# Patient Record
Sex: MALE | Race: BLACK OR AFRICAN AMERICAN | NOT HISPANIC OR LATINO | Employment: FULL TIME | URBAN - METROPOLITAN AREA
[De-identification: names, ages, dates, MRNs, and addresses within clinical notes are randomized per-mention and may not be internally consistent; named-entity substitution may affect disease eponyms.]

---

## 2021-11-23 ENCOUNTER — HISTORICAL (OUTPATIENT)
Dept: LAB | Facility: HOSPITAL | Age: 31
End: 2021-11-23

## 2021-11-23 LAB — SARS-COV-2 RNA RESP QL NAA+PROBE: NOT DETECTED

## 2022-02-25 ENCOUNTER — HISTORICAL (OUTPATIENT)
Dept: ADMINISTRATIVE | Facility: HOSPITAL | Age: 32
End: 2022-02-25

## 2022-03-04 ENCOUNTER — HISTORICAL (OUTPATIENT)
Dept: ADMINISTRATIVE | Facility: HOSPITAL | Age: 32
End: 2022-03-04

## 2022-05-30 ENCOUNTER — HOSPITAL ENCOUNTER (EMERGENCY)
Facility: HOSPITAL | Age: 32
Discharge: HOME | End: 2022-05-30
Attending: EMERGENCY MEDICINE

## 2022-05-30 VITALS
OXYGEN SATURATION: 97 % | BODY MASS INDEX: 45.52 KG/M2 | TEMPERATURE: 98 F | RESPIRATION RATE: 18 BRPM | DIASTOLIC BLOOD PRESSURE: 88 MMHG | HEART RATE: 104 BPM | SYSTOLIC BLOOD PRESSURE: 157 MMHG | HEIGHT: 67 IN | WEIGHT: 290 LBS

## 2022-05-30 DIAGNOSIS — S91.112A LACERATION OF LEFT GREAT TOE WITHOUT FOREIGN BODY PRESENT OR DAMAGE TO NAIL, INITIAL ENCOUNTER: Primary | ICD-10-CM

## 2022-05-30 PROCEDURE — 25000003 PHARM REV CODE 250: Performed by: NURSE PRACTITIONER

## 2022-05-30 PROCEDURE — 99283 EMERGENCY DEPT VISIT LOW MDM: CPT | Mod: 25

## 2022-05-30 RX ORDER — MUPIROCIN 20 MG/G
OINTMENT TOPICAL 3 TIMES DAILY
Qty: 30 G | Refills: 0 | Status: SHIPPED | OUTPATIENT
Start: 2022-05-30 | End: 2022-06-16 | Stop reason: SDUPTHER

## 2022-05-30 RX ADMIN — IBUPROFEN 800 MG: 200 TABLET, FILM COATED ORAL at 02:05

## 2022-05-30 NOTE — FIRST PROVIDER EVALUATION
"Medical screening exam completed.  I have conducted a focused provider triage encounter, findings are as follows:    Brief history of present illness:  32 year old male presents to ED for left great toe pain; reports he hit his foot on a piece of plywood 2 nights ago; patient reports drainage; denies fever    Vitals:    05/30/22 1203   BP: (!) 157/88   BP Location: Left arm   Patient Position: Sitting   Pulse: 104   Resp: 18   Temp: 98.1 °F (36.7 °C)   TempSrc: Oral   SpO2: 97%   Weight: 131.5 kg (290 lb)   Height: 5' 7" (1.702 m)       Pertinent physical exam:  Awake, alert    Brief workup plan:  X-ray foot     Preliminary workup initiated; this workup will be continued and followed by the physician or advanced practice provider that is assigned to the patient when roomed.  "

## 2022-05-30 NOTE — DISCHARGE INSTRUCTIONS
Keep wound clean with soap and water, apply bactroban ointment to the area twice a day. Tylenol and/or ibuprofen for pain as needed.

## 2022-05-30 NOTE — ED PROVIDER NOTES
Encounter Date: 5/30/2022       History     Chief Complaint   Patient presents with    Foot Pain     C/o L foot pain. Reports cut on L great toe 1 day ago.      32-year-old male who presents with left great toe superficial cut that have been on some broken diana at his house yesterday.  He states it hurts when he walks in certain positions.  He does not believe there is any type of foreign body in the cut.    The history is provided by the patient. No  was used.   Foot Pain  This is a new problem. The current episode started yesterday. The problem occurs constantly. The symptoms are aggravated by walking. Nothing relieves the symptoms. He has tried nothing for the symptoms. The treatment provided no relief.     Review of patient's allergies indicates:  No Known Allergies  No past medical history on file.  No past surgical history on file.  No family history on file.     Review of Systems   Respiratory: Negative.    Cardiovascular: Negative.    Skin:        Small 0.5 cm superficial cut on left great toe   All other systems reviewed and are negative.      Physical Exam     Initial Vitals [05/30/22 1203]   BP Pulse Resp Temp SpO2   (!) 157/88 104 18 98.1 °F (36.7 °C) 97 %      MAP       --         Physical Exam    Nursing note and vitals reviewed.  Constitutional: He appears well-developed and well-nourished.   Eyes: Conjunctivae are normal.   Cardiovascular: Intact distal pulses.   Pulmonary/Chest: No respiratory distress.   Musculoskeletal:        Legs:      Neurological: He is alert and oriented to person, place, and time. He has normal strength.   Skin: Skin is warm.   Psychiatric: He has a normal mood and affect.         ED Course   Procedures  Labs Reviewed - No data to display       Imaging Results          X-Ray Toe 2 or More Views Left (Final result)  Result time 05/30/22 12:42:57    Final result by Jolly Puente MD (05/30/22 12:42:57)                 Impression:      No acute  bony abnormality      Electronically signed by: Jolly Puente  Date:    05/30/2022  Time:    12:42             Narrative:    EXAMINATION:  XR TOE 2 OR MORE VIEWS LEFT    CLINICAL HISTORY:  left great toe pain; hit toe on piece of plywood 2 nights ago;    COMPARISON:  None.    FINDINGS:  There is no acute fracture or malalignment.  The soft tissues are unremarkable.                                 Medications   ibuprofen tablet 800 mg (has no administration in time range)     Medical Decision Making:   Clinical Tests:   Radiological Study: Ordered and Reviewed  ED Management:  Wound cleaned, dressed with triple antibiotic ointment and bandaid applied by ER nurse    Additional MDM:   Differential Diagnosis:   Other: The following diagnoses were also considered and will be evaluated: toe fracture, toe laceration.                    Clinical Impression:   Final diagnoses:  [S91.112A] Laceration of left great toe without foreign body present or damage to nail, initial encounter (Primary)          ED Disposition Condition    Discharge Stable        ED Prescriptions     Medication Sig Dispense Start Date End Date Auth. Provider    mupirocin (BACTROBAN) 2 % ointment Apply topically 3 (three) times daily. 30 g 5/30/2022  YURI Mcnair        Follow-up Information     Follow up With Specialties Details Why Contact Info    primary care provider  Call in 1 week As needed, If symptoms worsen            YURI Mcnair  05/30/22 0934

## 2022-06-16 ENCOUNTER — OFFICE VISIT (OUTPATIENT)
Dept: FAMILY MEDICINE | Facility: CLINIC | Age: 32
End: 2022-06-16

## 2022-06-16 VITALS
HEART RATE: 81 BPM | SYSTOLIC BLOOD PRESSURE: 139 MMHG | WEIGHT: 315 LBS | TEMPERATURE: 98 F | RESPIRATION RATE: 20 BRPM | HEIGHT: 67 IN | OXYGEN SATURATION: 97 % | DIASTOLIC BLOOD PRESSURE: 91 MMHG | BODY MASS INDEX: 49.44 KG/M2

## 2022-06-16 DIAGNOSIS — R35.0 INCREASED URINARY FREQUENCY: ICD-10-CM

## 2022-06-16 DIAGNOSIS — I10 PRIMARY HYPERTENSION: ICD-10-CM

## 2022-06-16 DIAGNOSIS — N52.8 OTHER MALE ERECTILE DYSFUNCTION: ICD-10-CM

## 2022-06-16 LAB
APPEARANCE UR: CLEAR
BACTERIA #/AREA URNS AUTO: ABNORMAL /HPF
BILIRUB UR QL STRIP.AUTO: NEGATIVE MG/DL
COLOR UR AUTO: ABNORMAL
GLUCOSE UR QL STRIP.AUTO: NORMAL MG/DL
HYALINE CASTS #/AREA URNS LPF: ABNORMAL /LPF
KETONES UR QL STRIP.AUTO: NEGATIVE MG/DL
LEUKOCYTE ESTERASE UR QL STRIP.AUTO: NEGATIVE UNIT/L
NITRITE UR QL STRIP.AUTO: NEGATIVE
PH UR STRIP.AUTO: 6.5 [PH]
PROT UR QL STRIP.AUTO: NEGATIVE MG/DL
RBC #/AREA URNS AUTO: ABNORMAL /HPF
RBC UR QL AUTO: NEGATIVE UNIT/L
SP GR UR STRIP.AUTO: 1.01
SQUAMOUS #/AREA URNS LPF: ABNORMAL /HPF
UROBILINOGEN UR STRIP-ACNC: NORMAL MG/DL
WBC #/AREA URNS AUTO: ABNORMAL /HPF

## 2022-06-16 PROCEDURE — 99213 OFFICE O/P EST LOW 20 MIN: CPT | Mod: PBBFAC,PN

## 2022-06-16 PROCEDURE — 81001 URINALYSIS AUTO W/SCOPE: CPT

## 2022-06-16 PROCEDURE — 99214 PR OFFICE/OUTPT VISIT, EST, LEVL IV, 30-39 MIN: ICD-10-PCS | Mod: S$PBB,,,

## 2022-06-16 PROCEDURE — 99214 OFFICE O/P EST MOD 30 MIN: CPT | Mod: S$PBB,,,

## 2022-06-16 PROCEDURE — 36415 COLL VENOUS BLD VENIPUNCTURE: CPT

## 2022-06-16 RX ORDER — HYDROCHLOROTHIAZIDE 12.5 MG/1
12.5 CAPSULE ORAL DAILY
COMMUNITY
Start: 2022-05-13 | End: 2022-06-16

## 2022-06-16 RX ORDER — SILDENAFIL 25 MG/1
25 TABLET, FILM COATED ORAL
Qty: 30 TABLET | Refills: 0 | Status: SHIPPED | OUTPATIENT
Start: 2022-06-16 | End: 2023-06-26

## 2022-06-16 RX ORDER — CITALOPRAM 20 MG/1
20 TABLET, FILM COATED ORAL EVERY MORNING
COMMUNITY
Start: 2022-06-03 | End: 2022-12-06 | Stop reason: SDUPTHER

## 2022-06-16 RX ORDER — HYDROCHLOROTHIAZIDE 25 MG/1
25 TABLET ORAL DAILY
Qty: 30 TABLET | Refills: 11 | Status: SHIPPED | OUTPATIENT
Start: 2022-06-16 | End: 2023-06-26 | Stop reason: SDUPTHER

## 2022-06-16 RX ORDER — MUPIROCIN 20 MG/G
OINTMENT TOPICAL 3 TIMES DAILY
Qty: 30 G | Refills: 0 | Status: SHIPPED | OUTPATIENT
Start: 2022-06-16 | End: 2023-06-26

## 2022-06-16 NOTE — PROGRESS NOTES
"Patient Name: Ryan Mcbride   : 1990  MRN: [id number]     Subjective:   Patient ID: Ryan Mcbride is a 32 y.o. male.    Chief Complaint:   Chief Complaint   Patient presents with    Follow-up        HPI:   2022:  Today about his weight and increased urination, we can screen him for diabetes today as this has never been done.  He is still self pay and has not yet obtained insurance.  He is hypertensive in clinic today denies headache, blurred vision, chest pain or shortness of breath.  Patient is getting  on Saturday in is concerned about his erectile dysfunction.  Patient also went to [facility] to try and help him lose weight.  States that he does not have time to work out because he works from -.  At length discussion with patient about healthy eating habits and exercise.      3/8/22: Patient went to the emergency department on  for left chest pain on evaluation was found to have abscess under left breast. This was drained by the surgery department. Patient returned to the emergency department on  to pull the packing and clean. I&D noted to be clean dry and in "better comparison to previous note" patient is following up with surgery clinic today at 11 AM. He is taking ibuprofen for the pain and was prescribed Bactrim 800/160 mg which she has been compliant with. Patient also on previous emergency department visits restarted on blood pressure medicine he is taking hydrochlorothiazide 12.5 mg daily. Denies blurred vision chest pain palpitations or shortness of breath. Patient recently had CBC/CMP in ED with no other preventative screenings previously performed. Patient concerned about his ability to lose weight, states that "all these medicines he is taking" are causing him to gain weight. Patient has combined all of his medicines that he has been prescribed from the emergency departments over the past 2 months into 1 pill bottle. Educated on importance of keeping " medications in original pill bottle and medication safety. Reeducated patient that he does not need to take his as needed NSAIDs and muscle relaxers and less he is in pain. Educated patient that the February 25 weight reading of 291 might of possibly been a miscalculation because 2 weeks prior on February 13 he weighed 324 pounds which is more consistent with his weight reading of 325 pounds today.      ROS:  Review of Systems   Constitutional: Negative for chills, fever and weight loss.   HENT: Negative for ear discharge, nosebleeds and tinnitus.    Eyes: Negative for blurred vision, photophobia and pain.   Respiratory: Negative for cough, shortness of breath, wheezing and stridor.    Cardiovascular: Negative for chest pain, palpitations and orthopnea.   Gastrointestinal: Negative for abdominal pain, heartburn and nausea.   Genitourinary: Negative for dysuria, frequency, hematuria and urgency.   Musculoskeletal: Negative for falls and myalgias.   Skin: Negative for itching and rash.   Neurological: Negative for dizziness, sensory change, speech change, focal weakness, seizures, weakness and headaches.   Endo/Heme/Allergies: Positive for polydipsia. Negative for environmental allergies. Does not bruise/bleed easily.   Psychiatric/Behavioral: Negative for hallucinations and suicidal ideas.      History:     Past Medical History:   Diagnosis Date    Hypertension       History reviewed. No pertinent surgical history.  History reviewed. No pertinent family history.   Social History     Tobacco Use    Smoking status: Never Smoker    Smokeless tobacco: Never Used   Substance and Sexual Activity    Alcohol use: Not Currently    Drug use: Never    Sexual activity: Yes     Partners: Female        Allergies: Review of patient's allergies indicates:  No Known Allergies  Objective:     Vitals:    06/16/22 1234   BP: (!) 139/91   Pulse: 81   Resp: 20   Temp: 98.1 °F (36.7 °C)   SpO2: 97%   Weight: (!) 152.6 kg (336 lb 6.8  "oz)   Height: 5' 7" (1.702 m)     Body mass index is 52.69 kg/m².     Physical Examination:   Physical Exam  Constitutional:       Appearance: Normal appearance.   HENT:      Head: Normocephalic.      Left Ear: Tympanic membrane normal.      Nose: Nose normal.      Mouth/Throat:      Mouth: Mucous membranes are moist.      Pharynx: Oropharynx is clear.   Eyes:      Conjunctiva/sclera: Conjunctivae normal.      Pupils: Pupils are equal, round, and reactive to light.   Cardiovascular:      Rate and Rhythm: Normal rate and regular rhythm.      Pulses: Normal pulses.      Heart sounds: Normal heart sounds.   Pulmonary:      Effort: Pulmonary effort is normal.      Breath sounds: Normal breath sounds.   Abdominal:      General: Abdomen is flat. Bowel sounds are normal.   Musculoskeletal:         General: Normal range of motion.      Cervical back: Normal range of motion.   Skin:     General: Skin is warm.      Capillary Refill: Capillary refill takes less than 2 seconds.   Neurological:      General: No focal deficit present.      Mental Status: He is alert and oriented to person, place, and time.   Psychiatric:         Mood and Affect: Mood normal.         Behavior: Behavior normal.         Assessment:     Problem List Items Addressed This Visit        Cardiac/Vascular    Primary hypertension (Chronic)    Overview     Low Sodium Diet (Dash Diet - less than 2 grams of sodium per day).  Monitor Blood Pressure daily and log. Report any consistent numbers greater than 140/90.  Smoking Cessation encouraged to aid in BP reduction.  Maintain healthy weight with goal BMI <30. Exercise 30 minutes per day 5 days per week             Current Assessment & Plan     Increase hctz to 25mg daily           Relevant Medications    hydroCHLOROthiazide (HYDRODIURIL) 25 MG tablet       Renal/    Other male erectile dysfunction (Chronic)    Overview     Monitor for prolonged erections, if occur go to ER immediately.  You may need to stop " smoking or using drugs, drink less alcohol, lose weight, reduce stress, and start exercising. There are pills that may help you get an erection. If these do not work, there are other medicines that can be injected or inserted into your penis. Vacuum pump or inflatable devices may also help you manage your ED.             Relevant Medications    sildenafiL (VIAGRA) 25 MG tablet    Increased urinary frequency (Chronic)    Current Assessment & Plan     checking A1c           Relevant Orders    Urinalysis    Hemoglobin A1C          Plan:   Ryan was seen today for follow-up.    Diagnoses and all orders for this visit:    Primary hypertension  -     hydroCHLOROthiazide (HYDRODIURIL) 25 MG tablet; Take 1 tablet (25 mg total) by mouth once daily.    Other male erectile dysfunction  -     sildenafiL (VIAGRA) 25 MG tablet; Take 1 tablet (25 mg total) by mouth as needed for Erectile Dysfunction.    Increased urinary frequency  -     Urinalysis  -     Hemoglobin A1C    Other orders  -     mupirocin (BACTROBAN) 2 % ointment; Apply topically 3 (three) times daily.       Follow up in about 2 weeks (around 6/30/2022) for review labs, BP recheck.       This note was created with the assistance of a voice recognition software or phone dictation. There may be transcription errors as a result of using this technology however minimal. Effort has been made to assure accuracy of transcription but any obvious errors or omissions should be clarified with the author of the document

## 2022-07-25 ENCOUNTER — HOSPITAL ENCOUNTER (EMERGENCY)
Facility: HOSPITAL | Age: 32
Discharge: LEFT BEFORE DISCHARGE | End: 2022-07-25

## 2022-07-25 VITALS
DIASTOLIC BLOOD PRESSURE: 87 MMHG | RESPIRATION RATE: 18 BRPM | SYSTOLIC BLOOD PRESSURE: 140 MMHG | HEART RATE: 96 BPM | TEMPERATURE: 98 F | BODY MASS INDEX: 53 KG/M2 | OXYGEN SATURATION: 96 % | WEIGHT: 315 LBS

## 2022-07-25 DIAGNOSIS — N50.82 SCROTAL PAIN: ICD-10-CM

## 2022-07-25 LAB
APPEARANCE UR: CLEAR
BACTERIA #/AREA URNS AUTO: NORMAL /HPF
BILIRUB UR QL STRIP.AUTO: NEGATIVE MG/DL
COLOR UR AUTO: YELLOW
FLUAV AG UPPER RESP QL IA.RAPID: NOT DETECTED
FLUBV AG UPPER RESP QL IA.RAPID: NOT DETECTED
GLUCOSE UR QL STRIP.AUTO: NEGATIVE MG/DL
KETONES UR QL STRIP.AUTO: NEGATIVE MG/DL
LEUKOCYTE ESTERASE UR QL STRIP.AUTO: NEGATIVE UNIT/L
NITRITE UR QL STRIP.AUTO: NEGATIVE
PH UR STRIP.AUTO: 5 [PH]
PROT UR QL STRIP.AUTO: NEGATIVE MG/DL
RBC #/AREA URNS AUTO: <5 /HPF
RBC UR QL AUTO: NEGATIVE UNIT/L
SARS-COV-2 RNA RESP QL NAA+PROBE: NOT DETECTED
SP GR UR STRIP.AUTO: 1.01 (ref 1–1.03)
SQUAMOUS #/AREA URNS AUTO: <5 /HPF
UROBILINOGEN UR STRIP-ACNC: 1 MG/DL
WBC #/AREA URNS AUTO: <5 /HPF

## 2022-07-25 PROCEDURE — 81001 URINALYSIS AUTO W/SCOPE: CPT | Performed by: PHYSICIAN ASSISTANT

## 2022-07-25 PROCEDURE — 87636 SARSCOV2 & INF A&B AMP PRB: CPT | Performed by: PHYSICIAN ASSISTANT

## 2022-07-25 PROCEDURE — 99284 EMERGENCY DEPT VISIT MOD MDM: CPT | Mod: 25

## 2022-07-25 NOTE — FIRST PROVIDER EVALUATION
Medical screening exam completed.  I have conducted a focused provider triage encounter, findings are as follows:    Chief Complaint   Patient presents with    Testicle Pain     Testicle pain starting today. States he needs a physical to see what is going on. Thinks he might have a rash, but isn't sure.  Denies any dysuria, fever, chills, discharge. Also requesting a COVID swab.     Brief history of present illness:  32 y.o. male presents to the ED with testicle pain onset today. Denies dysuria, discharge. Notes possible rash to penis/scrotum. Denies swelling.     Vitals:    07/25/22 1646   BP: (!) 140/87   Pulse: 96   Resp: 18   Temp: 98.2 °F (36.8 °C)   TempSrc: Temporal   SpO2: 96%   Weight: (!) 153.5 kg (338 lb 6.5 oz)     Pertinent physical exam:  Awake, alert, ambulatory, non-labored respirations    Brief workup plan:  US, UA, swab     Preliminary workup initiated; this workup will be continued and followed by the physician or advanced practice provider that is assigned to the patient when roomed.

## 2022-12-05 ENCOUNTER — OFFICE VISIT (OUTPATIENT)
Dept: URGENT CARE | Facility: CLINIC | Age: 32
End: 2022-12-05

## 2022-12-05 ENCOUNTER — TELEPHONE (OUTPATIENT)
Dept: URGENT CARE | Facility: CLINIC | Age: 32
End: 2022-12-05

## 2022-12-05 ENCOUNTER — HOSPITAL ENCOUNTER (OUTPATIENT)
Dept: RADIOLOGY | Facility: HOSPITAL | Age: 32
Discharge: HOME | End: 2022-12-05
Attending: NURSE PRACTITIONER

## 2022-12-05 VITALS
HEIGHT: 67 IN | DIASTOLIC BLOOD PRESSURE: 97 MMHG | WEIGHT: 315 LBS | OXYGEN SATURATION: 98 % | TEMPERATURE: 98 F | RESPIRATION RATE: 18 BRPM | SYSTOLIC BLOOD PRESSURE: 147 MMHG | BODY MASS INDEX: 49.44 KG/M2 | HEART RATE: 88 BPM

## 2022-12-05 DIAGNOSIS — M25.562 ACUTE PAIN OF LEFT KNEE: Primary | ICD-10-CM

## 2022-12-05 DIAGNOSIS — R05.1 ACUTE COUGH: ICD-10-CM

## 2022-12-05 DIAGNOSIS — J02.0 STREP THROAT: ICD-10-CM
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
## 2022-12-05 LAB
CTP QC/QA: YES
MOLECULAR STREP A: POSITIVE

## 2022-12-05 PROCEDURE — 73564 X-RAY EXAM KNEE 4 OR MORE: CPT | Mod: 26,LT,, | Performed by: RADIOLOGY

## 2022-12-05 PROCEDURE — 73564 X-RAY EXAM KNEE 4 OR MORE: CPT | Mod: TC,LT

## 2022-12-05 PROCEDURE — 87651 STREP A DNA AMP PROBE: CPT | Mod: PBBFAC | Performed by: NURSE PRACTITIONER

## 2022-12-05 PROCEDURE — 99214 OFFICE O/P EST MOD 30 MIN: CPT | Mod: S$PBB,,, | Performed by: NURSE PRACTITIONER

## 2022-12-05 PROCEDURE — 73564 XR KNEE COMP 4 OR MORE VIEWS LEFT: ICD-10-PCS | Mod: 26,LT,, | Performed by: RADIOLOGY

## 2022-12-05 PROCEDURE — 99214 OFFICE O/P EST MOD 30 MIN: CPT | Mod: PBBFAC | Performed by: NURSE PRACTITIONER

## 2022-12-05 PROCEDURE — 99214 PR OFFICE/OUTPT VISIT, EST, LEVL IV, 30-39 MIN: ICD-10-PCS | Mod: S$PBB,,, | Performed by: NURSE PRACTITIONER

## 2022-12-05 PROCEDURE — 87081 CULTURE SCREEN ONLY: CPT | Performed by: NURSE PRACTITIONER

## 2022-12-05 RX ORDER — AMOXICILLIN 875 MG/1
875 TABLET, FILM COATED ORAL 2 TIMES DAILY
Qty: 20 TABLET | Refills: 0 | Status: SHIPPED | OUTPATIENT
Start: 2022-12-05 | End: 2022-12-15

## 2022-12-05 RX ORDER — DICLOFENAC SODIUM 75 MG/1
75 TABLET, DELAYED RELEASE ORAL 2 TIMES DAILY
Qty: 60 TABLET | Refills: 0 | Status: SHIPPED | OUTPATIENT
Start: 2022-12-05 | End: 2023-01-04

## 2022-12-05 RX ORDER — PROMETHAZINE HYDROCHLORIDE AND DEXTROMETHORPHAN HYDROBROMIDE 6.25; 15 MG/5ML; MG/5ML
5 SYRUP ORAL EVERY 6 HOURS PRN
Qty: 100 ML | Refills: 0 | Status: SHIPPED | OUTPATIENT
Start: 2022-12-05 | End: 2024-03-12

## 2022-12-05 RX ORDER — LEVOCETIRIZINE DIHYDROCHLORIDE 5 MG/1
5 TABLET, FILM COATED ORAL NIGHTLY
Qty: 14 TABLET | Refills: 0 | Status: SHIPPED | OUTPATIENT
Start: 2022-12-05 | End: 2023-06-26 | Stop reason: SDUPTHER

## 2022-12-05 NOTE — PROGRESS NOTES
"Subjective:       Patient ID: Ryan Mcbride is a 32 y.o. male.    Vitals:  height is 5' 6.54" (1.69 m) and weight is 154 kg (339 lb 9.6 oz) (abnormal). His oral temperature is 98.4 °F (36.9 °C). His blood pressure is 147/97 (abnormal) and his pulse is 88. His respiration is 18 and oxygen saturation is 98%.     Chief Complaint: Cough (PRODUCTIVE X 3 DAYS), Headache, and Knee Pain (L knee pain , fall in oct 22')    Patient is a 32-year-old male, here today for cough, congestion, headache over the past 3 days.  Patient states he also has been having knee pain over the past month after a fall.  Rates pain 4/10, taking Tylenol for pain.  Not taking anything for respiratory symptoms.      Constitution: Negative.   HENT:  Positive for congestion and sinus pressure.    Neck: neck negative.   Cardiovascular: Negative.    Respiratory:  Positive for cough.    Musculoskeletal:  Positive for pain.     Objective:      Physical Exam   Constitutional: He is oriented to person, place, and time. He appears well-developed.   HENT:   Head: Normocephalic.   Ears:   Right Ear: Tympanic membrane normal.   Left Ear: Tympanic membrane normal.   Nose: Congestion present.   Mouth/Throat: Oropharynx is clear.   Eyes: Conjunctivae and EOM are normal. Pupils are equal, round, and reactive to light.   Neck: Neck supple.   Cardiovascular: Normal rate, regular rhythm and normal heart sounds.   Pulmonary/Chest: Effort normal and breath sounds normal.   Musculoskeletal: Normal range of motion.         General: Normal range of motion.        Legs:    Neurological: He is alert and oriented to person, place, and time.   Skin: Skin is warm and dry. Capillary refill takes less than 2 seconds.   Psychiatric: His behavior is normal.   Vitals reviewed.      Assessment:       1. Acute pain of left knee    2. Fall, initial encounter    3. Acute cough    4. Strep throat               No results found.   Plan:           Rapid Strep positive Start " medication, change toothbrush after 3 days of antibiotic.  Saltwater gargles.  May use acetaminophen alternate with ibuprofen as directed for comfort.  ER precautions.    XR L knee pending, will notify of abnormal results.   Medication as ordered, do not combine NSAIDs.  Hot or cold therapy.  Active range of motion exercises. F/u with pcp. ER precautions.     Acute pain of left knee  -     X-Ray Knee Complete 4 or More Views Left  -     diclofenac (VOLTAREN) 75 MG EC tablet; Take 1 tablet (75 mg total) by mouth 2 (two) times daily.  Dispense: 60 tablet; Refill: 0    Fall, initial encounter  -     X-Ray Knee Complete 4 or More Views Left    Acute cough  -     promethazine-dextromethorphan (PROMETHAZINE-DM) 6.25-15 mg/5 mL Syrp; Take 5 mLs by mouth every 6 (six) hours as needed (cough).  Dispense: 100 mL; Refill: 0    Strep throat  -     levocetirizine (XYZAL) 5 MG tablet; Take 1 tablet (5 mg total) by mouth every evening. for 14 days  Dispense: 14 tablet; Refill: 0  -     amoxicillin (AMOXIL) 875 MG tablet; Take 1 tablet (875 mg total) by mouth 2 (two) times daily. for 10 days  Dispense: 20 tablet; Refill: 0

## 2022-12-05 NOTE — LETTER
December 5, 2022      [facility]  [address] [facility] [address]  Phone: [phone]       Patient: Ryan Mcbride   YOB: 1990  Date of Visit: 12/05/2022    To Whom It May Concern:    Luz Mcbride  was at [facility] on 12/05/2022. The patient may return to work/school on 12/07/2022 with no restrictions. If you have any questions or concerns, or if I can be of further assistance, please do not hesitate to contact me.    Sincerely,    YURI Fuller

## 2022-12-06 ENCOUNTER — OFFICE VISIT (OUTPATIENT)
Dept: FAMILY MEDICINE | Facility: CLINIC | Age: 32
End: 2022-12-06

## 2022-12-06 VITALS
DIASTOLIC BLOOD PRESSURE: 96 MMHG | OXYGEN SATURATION: 99 % | TEMPERATURE: 99 F | BODY MASS INDEX: 53.87 KG/M2 | WEIGHT: 315 LBS | SYSTOLIC BLOOD PRESSURE: 158 MMHG

## 2022-12-06 DIAGNOSIS — I10 PRIMARY HYPERTENSION: Chronic | ICD-10-CM

## 2022-12-06 DIAGNOSIS — Z00.00 ENCOUNTER FOR WELLNESS EXAMINATION: Primary | ICD-10-CM

## 2022-12-06 DIAGNOSIS — F32.A DEPRESSION, UNSPECIFIED DEPRESSION TYPE: ICD-10-CM

## 2022-12-06 PROCEDURE — 99395 PREV VISIT EST AGE 18-39: CPT | Mod: S$PBB,,,

## 2022-12-06 PROCEDURE — 99395 PR PREVENTIVE VISIT,EST,18-39: ICD-10-PCS | Mod: S$PBB,,,

## 2022-12-06 PROCEDURE — 99213 OFFICE O/P EST LOW 20 MIN: CPT | Mod: PBBFAC,PN

## 2022-12-06 PROCEDURE — 99214 PR OFFICE/OUTPT VISIT, EST, LEVL IV, 30-39 MIN: ICD-10-PCS | Mod: 25,S$PBB,,

## 2022-12-06 PROCEDURE — 99214 OFFICE O/P EST MOD 30 MIN: CPT | Mod: 25,S$PBB,,

## 2022-12-06 RX ORDER — AMLODIPINE BESYLATE 10 MG/1
10 TABLET ORAL DAILY
Qty: 30 TABLET | Refills: 2 | Status: SHIPPED | OUTPATIENT
Start: 2022-12-06 | End: 2023-06-26

## 2022-12-06 RX ORDER — CITALOPRAM 20 MG/1
20 TABLET, FILM COATED ORAL EVERY MORNING
Qty: 30 TABLET | Refills: 5 | Status: SHIPPED | OUTPATIENT
Start: 2022-12-06 | End: 2023-06-09 | Stop reason: SDUPTHER

## 2022-12-06 NOTE — PROGRESS NOTES
"Patient Name: Ryan Mcbride   : 1990  MRN: [id number]     Subjective:   Patient ID: Ryan Mcbride is a 32 y.o. male.    Chief Complaint:   Chief Complaint   Patient presents with    Follow-up        HPI: 2022: Patient went to  yesterday with complaints of knee pain and cough and congestion. He is complaining again today of the same symptoms and has yet to  his medication due to financial concerns. Encouraged patient to  medication. He is hypertensive in clinic today, manual BP recheck was still elevated. He is amenable to adding medication to control this as he endorses frequent head aches. Denies CP, blurred vision, SOB, Edema or palpitation.  Additionally patient is requesting refill of his Celexa, states that he has been on Celexa for over 3 years.    2022:  Today about his weight and increased urination, we can screen him for diabetes today as this has never been done.  He is still self pay and has not yet obtained insurance.  He is hypertensive in clinic today denies headache, blurred vision, chest pain or shortness of breath.  Patient is getting  on Saturday in is concerned about his erectile dysfunction.  Patient also went to [place] [facility] to try and help him lose weight.  States that he does not have time to work out because he works from -.  At length discussion with patient about healthy eating habits and exercise.     3/8/22: Patient went to the emergency department on  for left chest pain on evaluation was found to have abscess under left breast. This was drained by the surgery department. Patient returned to the emergency department on  to pull the packing and clean. I&D noted to be clean dry and in "better comparison to previous note" patient is following up with surgery clinic today at 11 AM. He is taking ibuprofen for the pain and was prescribed Bactrim 800/160 mg which she has been compliant with. Patient also on previous emergency " "department visits restarted on blood pressure medicine he is taking hydrochlorothiazide 12.5 mg daily. Denies blurred vision chest pain palpitations or shortness of breath. Patient recently had CBC/CMP in ED with no other preventative screenings previously performed. Patient concerned about his ability to lose weight, states that "all these medicines he is taking" are causing him to gain weight. Patient has combined all of his medicines that he has been prescribed from the emergency departments over the past 2 months into 1 pill bottle. Educated on importance of keeping medications in original pill bottle and medication safety. Reeducated patient that he does not need to take his as needed NSAIDs and muscle relaxers and less he is in pain. Educated patient that the February 25 weight reading of 291 might of possibly been a miscalculation because 2 weeks prior on February 13 he weighed 324 pounds which is more consistent with his weight reading of 325 pounds today.      ROS:  Review of Systems   Constitutional:  Positive for malaise/fatigue. Negative for chills, fever and weight loss.   HENT:  Positive for congestion, sinus pain and sore throat. Negative for ear discharge, nosebleeds and tinnitus.    Eyes:  Negative for blurred vision, photophobia and pain.   Respiratory:  Negative for cough, shortness of breath, wheezing and stridor.    Cardiovascular:  Negative for chest pain, palpitations and orthopnea.   Gastrointestinal:  Negative for abdominal pain, heartburn and nausea.   Genitourinary:  Negative for dysuria, frequency, hematuria and urgency.   Musculoskeletal:  Positive for joint pain. Negative for falls and myalgias.   Skin:  Negative for itching and rash.   Neurological:  Positive for headaches. Negative for dizziness, sensory change, speech change, focal weakness, seizures and weakness.   Endo/Heme/Allergies:  Negative for environmental allergies. Does not bruise/bleed easily.   Psychiatric/Behavioral:  " Positive for depression. Negative for hallucinations and suicidal ideas.     History:     Past Medical History:   Diagnosis Date    Depression     Hypertension       History reviewed. No pertinent surgical history.  History reviewed. No pertinent family history.   Social History     Tobacco Use    Smoking status: Never    Smokeless tobacco: Never   Substance and Sexual Activity    Alcohol use: Not Currently    Drug use: Never    Sexual activity: Yes     Partners: Female        Allergies: Review of patient's allergies indicates:  No Known Allergies  Objective:     Vitals:    12/06/22 0919   BP: (!) 158/96   Temp: 98.8 °F (37.1 °C)   SpO2: 99%   Weight: (!) 153.9 kg (339 lb 3.2 oz)     Body mass index is 53.87 kg/m².     Physical Examination:   Physical Exam  Constitutional:       General: He is not in acute distress.     Appearance: Normal appearance. He is not ill-appearing.   HENT:      Nose: Congestion present.   Cardiovascular:      Rate and Rhythm: Normal rate and regular rhythm.      Heart sounds: Normal heart sounds.   Pulmonary:      Effort: Pulmonary effort is normal. No respiratory distress.      Breath sounds: Normal breath sounds.   Musculoskeletal:      Cervical back: Normal range of motion.   Skin:     General: Skin is warm and dry.   Neurological:      Mental Status: He is alert and oriented to person, place, and time.   Psychiatric:         Mood and Affect: Mood normal.         Behavior: Behavior normal.       Assessment:     Problem List Items Addressed This Visit          Psychiatric    Depression (Chronic)    Overview       Read positive daily meditations, avoid negative media, set healthy boundaries.  Exercise daily, keep consistent sleep pattern, eat a healthy diet.  Establish good social support, make changes to reduce stress.  Reports any symptoms of suicidal/homicidal ideations or self harm immediately, if clinic is closed go to nearest emergency room.           Current Assessment & Plan      Chronic issue, stable with Celexa 20mg daily. Continue medication. ED for SI/HI.         Relevant Medications    citalopram (CELEXA) 20 MG tablet       Cardiac/Vascular    Primary hypertension (Chronic)    Overview     Low Sodium Diet (Dash Diet - less than 2 grams of sodium per day).  Monitor Blood Pressure daily and log. Report any consistent numbers greater than 140/90.  Smoking Cessation encouraged to aid in BP reduction.  Maintain healthy weight with goal BMI <30. Exercise 30 minutes per day 5 days per week           Current Assessment & Plan     Chronic issue, worsening.  Patient states that he is compliant with hydrochlorothiazide 25 mg daily, we will add amlodipine 10 mg to better control patient's blood pressure.         Relevant Medications    amLODIPine (NORVASC) 10 MG tablet     Other Visit Diagnoses       Encounter for wellness examination    -  Primary    Relevant Orders    TSH    T4, Free    Hemoglobin A1C    SYPHILIS ANTIBODY (WITH REFLEX RPR)    Hepatitis Panel, Acute    Lipid Panel    CBC Auto Differential    Comprehensive Metabolic Panel    HIV 1/2 Ag/Ab (4th Gen)            Plan:   Ryan was seen today for follow-up.    Diagnoses and all orders for this visit:    Encounter for wellness examination  -     TSH  -     T4, Free  -     Hemoglobin A1C  -     SYPHILIS ANTIBODY (WITH REFLEX RPR)  -     Hepatitis Panel, Acute  -     Lipid Panel  -     CBC Auto Differential  -     Comprehensive Metabolic Panel  -     HIV 1/2 Ag/Ab (4th Gen)    Primary hypertension  -     amLODIPine (NORVASC) 10 MG tablet; Take 1 tablet (10 mg total) by mouth once daily.    Depression, unspecified depression type  -     citalopram (CELEXA) 20 MG tablet; Take 1 tablet (20 mg total) by mouth every morning.     Follow up in about 2 weeks (around 12/20/2022) for BP recheck, med change fu, review labs.     This note was created with the assistance of Dragon voice recognition software or phone dictation. There may be  transcription errors as a result of using this technology however minimal. Effort has been made to assure accuracy of transcription but any obvious errors or omissions should be clarified with the author of the document

## 2022-12-07 ENCOUNTER — TELEPHONE (OUTPATIENT)
Dept: URGENT CARE | Facility: CLINIC | Age: 32
End: 2022-12-07

## 2022-12-08 LAB — BACTERIA THROAT CULT: NORMAL

## 2023-01-26 ENCOUNTER — HOSPITAL ENCOUNTER (EMERGENCY)
Facility: HOSPITAL | Age: 33
Discharge: LEFT BEFORE DISCHARGE | End: 2023-01-26
Payer: COMMERCIAL

## 2023-01-26 VITALS
OXYGEN SATURATION: 98 % | BODY MASS INDEX: 49.44 KG/M2 | TEMPERATURE: 100 F | DIASTOLIC BLOOD PRESSURE: 89 MMHG | WEIGHT: 315 LBS | HEIGHT: 67 IN | RESPIRATION RATE: 17 BRPM | HEART RATE: 96 BPM | SYSTOLIC BLOOD PRESSURE: 152 MMHG

## 2023-01-26 LAB
FLUAV AG UPPER RESP QL IA.RAPID: NOT DETECTED
FLUBV AG UPPER RESP QL IA.RAPID: NOT DETECTED
SARS-COV-2 RNA RESP QL NAA+PROBE: DETECTED
STREP A PCR (OHS): NOT DETECTED

## 2023-01-26 PROCEDURE — 87651 STREP A DNA AMP PROBE: CPT | Performed by: PHYSICIAN ASSISTANT

## 2023-01-26 PROCEDURE — 99282 EMERGENCY DEPT VISIT SF MDM: CPT

## 2023-01-26 PROCEDURE — 0240U COVID/FLU A&B PCR: CPT | Performed by: PHYSICIAN ASSISTANT

## 2023-05-08 ENCOUNTER — TELEPHONE (OUTPATIENT)
Dept: FAMILY MEDICINE | Facility: CLINIC | Age: 33
End: 2023-05-08

## 2023-05-08 NOTE — TELEPHONE ENCOUNTER
LOV 12/6/23  Emergency medicine 1/26/23  No NOV scheduled. Called to make appt. No answer left   Meds last filled 5/5/23

## 2023-06-09 DIAGNOSIS — F32.A DEPRESSION, UNSPECIFIED DEPRESSION TYPE: ICD-10-CM

## 2023-06-09 RX ORDER — CITALOPRAM 20 MG/1
20 TABLET, FILM COATED ORAL EVERY MORNING
Qty: 30 TABLET | Refills: 5 | Status: SHIPPED | OUTPATIENT
Start: 2023-06-09 | End: 2023-06-26

## 2023-06-26 ENCOUNTER — OFFICE VISIT (OUTPATIENT)
Dept: FAMILY MEDICINE | Facility: CLINIC | Age: 33
End: 2023-06-26
Payer: COMMERCIAL

## 2023-06-26 VITALS
WEIGHT: 315 LBS | DIASTOLIC BLOOD PRESSURE: 88 MMHG | OXYGEN SATURATION: 96 % | RESPIRATION RATE: 18 BRPM | HEIGHT: 67 IN | TEMPERATURE: 99 F | BODY MASS INDEX: 49.44 KG/M2 | HEART RATE: 91 BPM | SYSTOLIC BLOOD PRESSURE: 138 MMHG

## 2023-06-26 DIAGNOSIS — I10 PRIMARY HYPERTENSION: ICD-10-CM

## 2023-06-26 DIAGNOSIS — N52.8 OTHER MALE ERECTILE DYSFUNCTION: Chronic | ICD-10-CM

## 2023-06-26 DIAGNOSIS — F32.A DEPRESSION, UNSPECIFIED DEPRESSION TYPE: Chronic | ICD-10-CM

## 2023-06-26 DIAGNOSIS — J02.0 STREP THROAT: ICD-10-CM

## 2023-06-26 DIAGNOSIS — Z00.00 ENCOUNTER FOR WELLNESS EXAMINATION: ICD-10-CM

## 2023-06-26 DIAGNOSIS — E66.01 MORBID (SEVERE) OBESITY DUE TO EXCESS CALORIES: Primary | ICD-10-CM

## 2023-06-26 DIAGNOSIS — Z87.898 HISTORY OF PREDIABETES: ICD-10-CM

## 2023-06-26 PROCEDURE — 3075F SYST BP GE 130 - 139MM HG: CPT | Mod: CPTII,,,

## 2023-06-26 PROCEDURE — 3079F DIAST BP 80-89 MM HG: CPT | Mod: CPTII,,,

## 2023-06-26 PROCEDURE — 99214 OFFICE O/P EST MOD 30 MIN: CPT | Mod: PBBFAC,PN

## 2023-06-26 PROCEDURE — 1159F PR MEDICATION LIST DOCUMENTED IN MEDICAL RECORD: ICD-10-PCS | Mod: CPTII,,,

## 2023-06-26 PROCEDURE — 1159F MED LIST DOCD IN RCRD: CPT | Mod: CPTII,,,

## 2023-06-26 PROCEDURE — 99214 OFFICE O/P EST MOD 30 MIN: CPT | Mod: S$PBB,,,

## 2023-06-26 PROCEDURE — 3075F PR MOST RECENT SYSTOLIC BLOOD PRESS GE 130-139MM HG: ICD-10-PCS | Mod: CPTII,,,

## 2023-06-26 PROCEDURE — 1160F PR REVIEW ALL MEDS BY PRESCRIBER/CLIN PHARMACIST DOCUMENTED: ICD-10-PCS | Mod: CPTII,,,

## 2023-06-26 PROCEDURE — 3079F PR MOST RECENT DIASTOLIC BLOOD PRESSURE 80-89 MM HG: ICD-10-PCS | Mod: CPTII,,,

## 2023-06-26 PROCEDURE — 99214 PR OFFICE/OUTPT VISIT, EST, LEVL IV, 30-39 MIN: ICD-10-PCS | Mod: S$PBB,,,

## 2023-06-26 PROCEDURE — 3008F BODY MASS INDEX DOCD: CPT | Mod: CPTII,,,

## 2023-06-26 PROCEDURE — 3008F PR BODY MASS INDEX (BMI) DOCUMENTED: ICD-10-PCS | Mod: CPTII,,,

## 2023-06-26 PROCEDURE — 1160F RVW MEDS BY RX/DR IN RCRD: CPT | Mod: CPTII,,,

## 2023-06-26 RX ORDER — HYDROCHLOROTHIAZIDE 25 MG/1
25 TABLET ORAL DAILY
Qty: 30 TABLET | Refills: 11 | Status: SHIPPED | OUTPATIENT
Start: 2023-06-26 | End: 2023-06-26 | Stop reason: SDUPTHER

## 2023-06-26 RX ORDER — SEMAGLUTIDE 0.68 MG/ML
0.25 INJECTION, SOLUTION SUBCUTANEOUS
Qty: 3 ML | Refills: 0 | Status: SHIPPED | OUTPATIENT
Start: 2023-06-26 | End: 2023-07-24

## 2023-06-26 RX ORDER — BUPROPION HYDROCHLORIDE 150 MG/1
150 TABLET ORAL DAILY
Qty: 90 TABLET | Refills: 3 | Status: SHIPPED | OUTPATIENT
Start: 2023-06-26 | End: 2024-06-25

## 2023-06-26 RX ORDER — LEVOCETIRIZINE DIHYDROCHLORIDE 5 MG/1
5 TABLET, FILM COATED ORAL NIGHTLY
Qty: 90 TABLET | Refills: 3 | Status: SHIPPED | OUTPATIENT
Start: 2023-06-26 | End: 2024-06-25

## 2023-06-26 RX ORDER — TADALAFIL 10 MG/1
10 TABLET ORAL DAILY PRN
Qty: 30 TABLET | Refills: 5 | Status: SHIPPED | OUTPATIENT
Start: 2023-06-26 | End: 2024-03-12 | Stop reason: SDUPTHER

## 2023-06-26 RX ORDER — DICLOFENAC SODIUM 75 MG/1
75 TABLET, DELAYED RELEASE ORAL
COMMUNITY
Start: 2022-12-05 | End: 2023-09-23

## 2023-06-26 RX ORDER — HYDROCHLOROTHIAZIDE 25 MG/1
25 TABLET ORAL DAILY
Qty: 90 TABLET | Refills: 3 | Status: SHIPPED | OUTPATIENT
Start: 2023-06-26 | End: 2024-03-12 | Stop reason: SDUPTHER

## 2023-06-26 NOTE — ASSESSMENT & PLAN NOTE
Patient is stable and well controlled today, denies any headaches, blurred vision, chest pain, shortness is a breath or edema.  Patient never continue taking amlodipine due to not following up.  Has been compliant with 25 mg hydrochlorothiazide.

## 2023-06-26 NOTE — ASSESSMENT & PLAN NOTE
BMI Body mass index is 54.47 kg/m².   Goal BMI <30.  Exercise 5 times a week for 30 minutes per day.  Avoid soda, simple sugars, excessive rice, potatoes or bread. Limit fast foods and fried foods.  Choose complex carbs in moderation (example: green vegetables, beans, oatmeal). Eat plenty of fresh fruits and vegetables with lean meats daily.  Do not skip meals. Eat a balanced portion size.  Avoid fad diets. Consider permanent healthy life style changes.       To obtain authorization for Ozempic, patient also amenable to referral to bariatric surgery

## 2023-06-26 NOTE — PROGRESS NOTES
Patient Name: Ryan Mcbride   : 1990  MRN: [id number]     Subjective:   Patient ID: Ryan Mcbride is a 33 y.o. male.    Chief Complaint:   Chief Complaint   Patient presents with    Medication Refill        HPI: 2023:  Patient presents to clinic today with concerns of his weight gain, wanting to switch his medicine from Viagra to something else.  Patient also has history of hyperlipidemia as well as hypertension.  At length discussion with patient today about importance of getting blood work.  Patient never completed any previous blood work ordered in system.  We will attempt to collect those labs today.  Does have history of erectile dysfunction, complaining that that is not improved.  He is never started taking Viagra that was previously prescribed to him states that he heard bad things about it so he never tried it.  He is open to trying a different medication today.  Patient also complaining of weight gain and inability to lose weight.  Patient denies following any specific diet, denies routine physical exercise.  He is amenable to trial of Ozempic if covered by insurance as well as referral to bariatric surgery.  Patient denies any personal or family history of thyroid medullary cancers.  Patient does have history of prediabetes.    2022: Patient went to  yesterday with complaints of knee pain and cough and congestion. He is complaining again today of the same symptoms and has yet to  his medication due to financial concerns. Encouraged patient to  medication. He is hypertensive in clinic today, manual BP recheck was still elevated. He is amenable to adding medication to control this as he endorses frequent head aches. Denies CP, blurred vision, SOB, Edema or palpitation.  Additionally patient is requesting refill of his Celexa, states that he has been on Celexa for over 3 years.    2022:  Today about his weight and increased urination, we can screen him for  "diabetes today as this has never been done.  He is still self pay and has not yet obtained insurance.  He is hypertensive in clinic today denies headache, blurred vision, chest pain or shortness of breath.  Patient is getting  on Saturday in is concerned about his erectile dysfunction.  Patient also went to [place] [facility] to try and help him lose weight.  States that he does not have time to work out because he works from 11-8.  At length discussion with patient about healthy eating habits and exercise.     3/8/22: Patient went to the emergency department on March 4 for left chest pain on evaluation was found to have abscess under left breast. This was drained by the surgery department. Patient returned to the emergency department on March 5 to pull the packing and clean. I&D noted to be clean dry and in "better comparison to previous note" patient is following up with surgery clinic today at 11 AM. He is taking ibuprofen for the pain and was prescribed Bactrim 800/160 mg which she has been compliant with. Patient also on previous emergency department visits restarted on blood pressure medicine he is taking hydrochlorothiazide 12.5 mg daily. Denies blurred vision chest pain palpitations or shortness of breath. Patient recently had CBC/CMP in ED with no other preventative screenings previously performed. Patient concerned about his ability to lose weight, states that "all these medicines he is taking" are causing him to gain weight. Patient has combined all of his medicines that he has been prescribed from the emergency departments over the past 2 months into 1 pill bottle. Educated on importance of keeping medications in original pill bottle and medication safety. Reeducated patient that he does not need to take his as needed NSAIDs and muscle relaxers and less he is in pain. Educated patient that the February 25 weight reading of 291 might of possibly been a miscalculation because 2 weeks prior on February 13 " "he weighed 324 pounds which is more consistent with his weight reading of 325 pounds today.      ROS:  Review of Systems   Constitutional:  Negative for chills, fever and weight loss.   HENT:  Negative for ear discharge, nosebleeds and tinnitus.    Eyes:  Negative for blurred vision, photophobia and pain.   Respiratory:  Negative for cough, shortness of breath, wheezing and stridor.    Cardiovascular:  Negative for chest pain, palpitations and orthopnea.   Gastrointestinal:  Negative for abdominal pain, heartburn and nausea.   Genitourinary:  Negative for dysuria, frequency, hematuria and urgency.   Musculoskeletal:  Negative for falls and myalgias.   Skin:  Negative for itching and rash.   Neurological:  Negative for dizziness, sensory change, speech change, focal weakness, seizures, weakness and headaches.   Endo/Heme/Allergies:  Negative for environmental allergies. Does not bruise/bleed easily.   Psychiatric/Behavioral:  Negative for hallucinations and suicidal ideas.     History:     Past Medical History:   Diagnosis Date    Depression     Hypertension       History reviewed. No pertinent surgical history.  History reviewed. No pertinent family history.   Social History     Tobacco Use    Smoking status: Never    Smokeless tobacco: Never   Substance and Sexual Activity    Alcohol use: Not Currently    Drug use: Never    Sexual activity: Yes     Partners: Female        Allergies: Review of patient's allergies indicates:  No Known Allergies  Objective:     Vitals:    06/26/23 1212 06/26/23 1217   BP: (!) 145/99 138/88   Pulse: 91    Resp: 18    Temp: 98.6 °F (37 °C)    TempSrc: Oral    SpO2: 96%    Weight: (!) 157.8 kg (347 lb 12.8 oz)    Height: 5' 7" (1.702 m)      Body mass index is 54.47 kg/m².     Physical Examination:   Physical Exam  Constitutional:       General: He is not in acute distress.     Appearance: Normal appearance. He is obese. He is not ill-appearing.   Cardiovascular:      Rate and Rhythm: " Normal rate and regular rhythm.      Heart sounds: Normal heart sounds.   Pulmonary:      Effort: Pulmonary effort is normal. No respiratory distress.      Breath sounds: Normal breath sounds.   Musculoskeletal:      Cervical back: Normal range of motion.   Skin:     General: Skin is warm and dry.   Neurological:      Mental Status: He is alert and oriented to person, place, and time.   Psychiatric:         Mood and Affect: Mood normal.         Behavior: Behavior normal.       Assessment and Plan     Problem List Items Addressed This Visit          Psychiatric    Depression (Chronic)    Overview       Read positive daily meditations, avoid negative media, set healthy boundaries.  Exercise daily, keep consistent sleep pattern, eat a healthy diet.  Establish good social support, make changes to reduce stress.  Reports any symptoms of suicidal/homicidal ideations or self harm immediately, if clinic is closed go to nearest emergency room.           Current Assessment & Plan     Patient would like to switch to Wellbutrin, states that his mother told him it will help him lose weight.  At length discussion with patient about medication uses.  States that he is wanting to switch his Celexa anyway.  Discussed with patient medication regimen to stop selection and start Wellbutrin.    Started on medication at office visit today, medication side effects, risks and goals discussed with patient. All questions asked  and answered.  Patient given enough medication, with refills as necessary, to last until patient's follow-up visit.            Relevant Medications    buPROPion (WELLBUTRIN XL) 150 MG TB24 tablet       Cardiac/Vascular    Hypertension    Overview     Low Sodium Diet (Dash Diet - less than 2 grams of sodium per day).  Monitor Blood Pressure daily and log. Report any consistent numbers greater than 140/90.  Smoking Cessation encouraged to aid in BP reduction.  Maintain healthy weight with goal BMI <30. Exercise 30  "minutes per day 5 days per week           Current Assessment & Plan     Patient is stable and well controlled today, denies any headaches, blurred vision, chest pain, shortness is a breath or edema.  Patient never continue taking amlodipine due to not following up.  Has been compliant with 25 mg hydrochlorothiazide.         Relevant Medications    hydroCHLOROthiazide (HYDRODIURIL) 25 MG tablet       Renal/    Other male erectile dysfunction (Chronic)    Overview     Monitor for prolonged erections, if occur go to ER immediately.  You may need to stop smoking or using drugs, drink less alcohol, lose weight, reduce stress, and start exercising. There are pills that may help you get an erection. If these do not work, there are other medicines that can be injected or inserted into your penis. Vacuum pump or inflatable devices may also help you manage your ED.           Current Assessment & Plan     States he never tried viagra due to "that medication having a bad reputation"          Relevant Medications    tadalafiL (CIALIS) 10 MG tablet       Endocrine    Morbid obesity - Primary    Current Assessment & Plan     BMI Body mass index is 54.47 kg/m².   Goal BMI <30.  Exercise 5 times a week for 30 minutes per day.  Avoid soda, simple sugars, excessive rice, potatoes or bread. Limit fast foods and fried foods.  Choose complex carbs in moderation (example: green vegetables, beans, oatmeal). Eat plenty of fresh fruits and vegetables with lean meats daily.  Do not skip meals. Eat a balanced portion size.  Avoid fad diets. Consider permanent healthy life style changes.       To obtain authorization for Ozempic, patient also amenable to referral to bariatric surgery         Relevant Medications    semaglutide (OZEMPIC) 0.25 mg or 0.5 mg (2 mg/3 mL) pen injector     Other Visit Diagnoses       Encounter for wellness examination        Relevant Orders    TSH    T4, Free    Hemoglobin A1C    SYPHILIS ANTIBODY (WITH REFLEX RPR) "    Hepatitis Panel, Acute    Lipid Panel    CBC Auto Differential    Comprehensive Metabolic Panel    HIV 1/2 Ag/Ab (4th Gen)    Vitamin D    TSH    T4, Free    Hemoglobin A1C    SYPHILIS ANTIBODY (WITH REFLEX RPR)    Hepatitis Panel, Acute    Lipid Panel    CBC Auto Differential    Comprehensive Metabolic Panel    HIV 1/2 Ag/Ab (4th Gen)    Vitamin D    Strep throat        Relevant Medications    levocetirizine (XYZAL) 5 MG tablet    History of prediabetes        A1c today, we will collect labs to trend if this has improved, trial Ozempic 0.25 mg q.week    Relevant Medications    semaglutide (OZEMPIC) 0.25 mg or 0.5 mg (2 mg/3 mL) pen injector              Ryan was seen today for medication refill.    Diagnoses and all orders for this visit:    Morbid (severe) obesity due to excess calories  -     semaglutide (OZEMPIC) 0.25 mg or 0.5 mg (2 mg/3 mL) pen injector; Inject 0.25 mg into the skin every 7 days.  -     Ambulatory referral/consult to Bariatric Surgery; Future    Encounter for wellness examination  -     TSH; Future  -     T4, Free; Future  -     Hemoglobin A1C; Future  -     SYPHILIS ANTIBODY (WITH REFLEX RPR); Future  -     Hepatitis Panel, Acute; Future  -     Lipid Panel; Future  -     CBC Auto Differential; Future  -     Comprehensive Metabolic Panel; Future  -     HIV 1/2 Ag/Ab (4th Gen); Future  -     Vitamin D; Future  -     TSH  -     T4, Free  -     Hemoglobin A1C  -     SYPHILIS ANTIBODY (WITH REFLEX RPR)  -     Hepatitis Panel, Acute  -     Lipid Panel  -     CBC Auto Differential  -     Comprehensive Metabolic Panel  -     HIV 1/2 Ag/Ab (4th Gen)  -     Vitamin D    Strep throat  -     levocetirizine (XYZAL) 5 MG tablet; Take 1 tablet (5 mg total) by mouth every evening.    Primary hypertension  -     Discontinue: hydroCHLOROthiazide (HYDRODIURIL) 25 MG tablet; Take 1 tablet (25 mg total) by mouth once daily.  -     hydroCHLOROthiazide (HYDRODIURIL) 25 MG tablet; Take 1 tablet (25 mg total)  by mouth once daily.    History of prediabetes  Comments:  A1c today, we will collect labs to trend if this has improved, trial Ozempic 0.25 mg q.week  Orders:  -     semaglutide (OZEMPIC) 0.25 mg or 0.5 mg (2 mg/3 mL) pen injector; Inject 0.25 mg into the skin every 7 days.    Depression, unspecified depression type  -     buPROPion (WELLBUTRIN XL) 150 MG TB24 tablet; Take 1 tablet (150 mg total) by mouth once daily.    Other male erectile dysfunction  -     tadalafiL (CIALIS) 10 MG tablet; Take 1 tablet (10 mg total) by mouth daily as needed for Erectile Dysfunction.         Follow up in about 4 weeks (around 7/24/2023).     This note was created with the assistance of Dragon voice recognition software or phone dictation. There may be transcription errors as a result of using this technology however minimal. Effort has been made to assure accuracy of transcription but any obvious errors or omissions should be clarified with the author of the document

## 2023-06-26 NOTE — ASSESSMENT & PLAN NOTE
Patient would like to switch to Wellbutrin, states that his mother told him it will help him lose weight.  At length discussion with patient about medication uses.  States that he is wanting to switch his Celexa anyway.  Discussed with patient medication regimen to stop selection and start Wellbutrin.    Started on medication at office visit today, medication side effects, risks and goals discussed with patient. All questions asked  and answered.  Patient given enough medication, with refills as necessary, to last until patient's follow-up visit.

## 2023-07-24 ENCOUNTER — OFFICE VISIT (OUTPATIENT)
Dept: FAMILY MEDICINE | Facility: CLINIC | Age: 33
End: 2023-07-24
Payer: COMMERCIAL

## 2023-07-24 VITALS
BODY MASS INDEX: 49.44 KG/M2 | SYSTOLIC BLOOD PRESSURE: 148 MMHG | TEMPERATURE: 98 F | HEIGHT: 67 IN | RESPIRATION RATE: 18 BRPM | DIASTOLIC BLOOD PRESSURE: 95 MMHG | HEART RATE: 96 BPM | OXYGEN SATURATION: 97 % | WEIGHT: 315 LBS

## 2023-07-24 DIAGNOSIS — I10 HYPERTENSION, UNSPECIFIED TYPE: Primary | ICD-10-CM

## 2023-07-24 DIAGNOSIS — N52.8 OTHER MALE ERECTILE DYSFUNCTION: Chronic | ICD-10-CM

## 2023-07-24 DIAGNOSIS — M79.672 BILATERAL FOOT PAIN: ICD-10-CM

## 2023-07-24 DIAGNOSIS — E66.01 MORBID (SEVERE) OBESITY DUE TO EXCESS CALORIES: ICD-10-CM

## 2023-07-24 DIAGNOSIS — M79.671 BILATERAL FOOT PAIN: ICD-10-CM

## 2023-07-24 DIAGNOSIS — Z00.00 ENCOUNTER FOR WELLNESS EXAMINATION: ICD-10-CM

## 2023-07-24 LAB
APPEARANCE UR: CLEAR
BACTERIA #/AREA URNS AUTO: ABNORMAL /HPF
BILIRUB UR QL STRIP.AUTO: NEGATIVE
COLOR UR: ABNORMAL
GLUCOSE UR QL STRIP.AUTO: NORMAL
HYALINE CASTS #/AREA URNS LPF: ABNORMAL /LPF
KETONES UR QL STRIP.AUTO: NEGATIVE
LEUKOCYTE ESTERASE UR QL STRIP.AUTO: NEGATIVE
MUCOUS THREADS URNS QL MICRO: ABNORMAL /LPF
NITRITE UR QL STRIP.AUTO: NEGATIVE
PH UR STRIP.AUTO: 7 [PH]
PROT UR QL STRIP.AUTO: NEGATIVE
RBC #/AREA URNS AUTO: ABNORMAL /HPF
RBC UR QL AUTO: NEGATIVE
SP GR UR STRIP.AUTO: 1.01
SQUAMOUS #/AREA URNS LPF: ABNORMAL /HPF
UROBILINOGEN UR STRIP-ACNC: NORMAL
WBC #/AREA URNS AUTO: ABNORMAL /HPF

## 2023-07-24 PROCEDURE — 99214 PR OFFICE/OUTPT VISIT, EST, LEVL IV, 30-39 MIN: ICD-10-PCS | Mod: S$PBB,,,

## 2023-07-24 PROCEDURE — 99215 OFFICE O/P EST HI 40 MIN: CPT | Mod: PBBFAC,PN

## 2023-07-24 PROCEDURE — 1160F RVW MEDS BY RX/DR IN RCRD: CPT | Mod: CPTII,,,

## 2023-07-24 PROCEDURE — 3080F DIAST BP >= 90 MM HG: CPT | Mod: CPTII,,,

## 2023-07-24 PROCEDURE — 3008F BODY MASS INDEX DOCD: CPT | Mod: CPTII,,,

## 2023-07-24 PROCEDURE — 1159F PR MEDICATION LIST DOCUMENTED IN MEDICAL RECORD: ICD-10-PCS | Mod: CPTII,,,

## 2023-07-24 PROCEDURE — 99214 OFFICE O/P EST MOD 30 MIN: CPT | Mod: S$PBB,,,

## 2023-07-24 PROCEDURE — 3077F SYST BP >= 140 MM HG: CPT | Mod: CPTII,,,

## 2023-07-24 PROCEDURE — 3008F PR BODY MASS INDEX (BMI) DOCUMENTED: ICD-10-PCS | Mod: CPTII,,,

## 2023-07-24 PROCEDURE — 1160F PR REVIEW ALL MEDS BY PRESCRIBER/CLIN PHARMACIST DOCUMENTED: ICD-10-PCS | Mod: CPTII,,,

## 2023-07-24 PROCEDURE — 1159F MED LIST DOCD IN RCRD: CPT | Mod: CPTII,,,

## 2023-07-24 PROCEDURE — 81001 URINALYSIS AUTO W/SCOPE: CPT

## 2023-07-24 PROCEDURE — 3077F PR MOST RECENT SYSTOLIC BLOOD PRESSURE >= 140 MM HG: ICD-10-PCS | Mod: CPTII,,,

## 2023-07-24 PROCEDURE — 3080F PR MOST RECENT DIASTOLIC BLOOD PRESSURE >= 90 MM HG: ICD-10-PCS | Mod: CPTII,,,

## 2023-07-24 RX ORDER — AMLODIPINE BESYLATE 5 MG/1
5 TABLET ORAL DAILY
Qty: 60 TABLET | Refills: 0 | Status: SHIPPED | OUTPATIENT
Start: 2023-07-24 | End: 2024-03-12 | Stop reason: SDUPTHER

## 2023-07-24 NOTE — PROGRESS NOTES
"Patient Name: Ryan Mcbride     : 1990    MRN: [id number]     Subjective:     Patient ID: Ryan Mcbride is a 33 y.o. male.    Chief Complaint:   Chief Complaint   Patient presents with    Follow-up     4 week f/u. States has been feeling differently since starting Wellbutrin. States doesn't know when to take it due having stomach issues after taking med.         HPI: 2023:  Patient presents to clinic today to follow-up on new medications started last office visit.  Patient has been taking Wellbutrin, states that he is upset because he has not lost any weight yet.  At length discussion with patient about mechanism of action of Wellbutrin.  He denies any complications from medications such as SI/HI, yen or hallucinations.  Patient is amenable to referral to nutrition therapy.  He did attend a zoom session for bariatric surgery class and states that he does not think he will be able to take 2 weeks off of work as his wife "sleeps all the time so nothing will get done".  He was unable to get Ozempic. Also complains bilateral foot pain, does not have shoes with insoles. Does have feeling in his feet, no wounds. No known injury     2023:  Patient presents to clinic today with concerns of his weight gain, wanting to switch his medicine from Viagra to something else.  Patient also has history of hyperlipidemia as well as hypertension.  At length discussion with patient today about importance of getting blood work.  Patient never completed any previous blood work ordered in system.  We will attempt to collect those labs today.  Does have history of erectile dysfunction, complaining that that is not improved.  He is never started taking Viagra that was previously prescribed to him states that he heard bad things about it so he never tried it.  He is open to trying a different medication today.  Patient also complaining of weight gain and inability to lose weight.  Patient denies following any " "specific diet, denies routine physical exercise.  He is amenable to trial of Ozempic if covered by insurance as well as referral to bariatric surgery.  Patient denies any personal or family history of thyroid medullary cancers.  Patient does have history of prediabetes.    12/06/2022: Patient went to  yesterday with complaints of knee pain and cough and congestion. He is complaining again today of the same symptoms and has yet to  his medication due to financial concerns. Encouraged patient to  medication. He is hypertensive in clinic today, manual BP recheck was still elevated. He is amenable to adding medication to control this as he endorses frequent head aches. Denies CP, blurred vision, SOB, Edema or palpitation.  Additionally patient is requesting refill of his Celexa, states that he has been on Celexa for over 3 years.    06/16/2022:  Today about his weight and increased urination, we can screen him for diabetes today as this has never been done.  He is still self pay and has not yet obtained insurance.  He is hypertensive in clinic today denies headache, blurred vision, chest pain or shortness of breath.  Patient is getting  on Saturday in is concerned about his erectile dysfunction.  Patient also went to [facility] to try and help him lose weight.  States that he does not have time to work out because he works from 11-8.  At length discussion with patient about healthy eating habits and exercise.     3/8/22: Patient went to the emergency department on March 4 for left chest pain on evaluation was found to have abscess under left breast. This was drained by the surgery department. Patient returned to the emergency department on March 5 to pull the packing and clean. I&D noted to be clean dry and in "better comparison to previous note" patient is following up with surgery clinic today at 11 AM. He is taking ibuprofen for the pain and was prescribed Bactrim 800/160 mg which she has " "been compliant with. Patient also on previous emergency department visits restarted on blood pressure medicine he is taking hydrochlorothiazide 12.5 mg daily. Denies blurred vision chest pain palpitations or shortness of breath. Patient recently had CBC/CMP in ED with no other preventative screenings previously performed. Patient concerned about his ability to lose weight, states that "all these medicines he is taking" are causing him to gain weight. Patient has combined all of his medicines that he has been prescribed from the emergency departments over the past 2 months into 1 pill bottle. Educated on importance of keeping medications in original pill bottle and medication safety. Reeducated patient that he does not need to take his as needed NSAIDs and muscle relaxers and less he is in pain. Educated patient that the February 25 weight reading of 291 might of possibly been a miscalculation because 2 weeks prior on February 13 he weighed 324 pounds which is more consistent with his weight reading of 325 pounds today.      ROS:      12 point review of systems conducted, negative except as stated in the history of present illness. See HPI for details.    History:     Past Medical History:   Diagnosis Date    Depression     Hypertension         History reviewed. No pertinent surgical history.    History reviewed. No pertinent family history.     Social History     Tobacco Use    Smoking status: Never    Smokeless tobacco: Never   Substance and Sexual Activity    Alcohol use: Not Currently    Drug use: Never    Sexual activity: Yes     Partners: Female       Current Outpatient Medications   Medication Instructions    amLODIPine (NORVASC) 5 mg, Oral, Daily    buPROPion (WELLBUTRIN XL) 150 mg, Oral, Daily    diclofenac (VOLTAREN) 75 mg, Oral    hydroCHLOROthiazide (HYDRODIURIL) 25 mg, Oral, Daily    levocetirizine (XYZAL) 5 mg, Oral, Nightly    promethazine-dextromethorphan (PROMETHAZINE-DM) 6.25-15 mg/5 mL Syrp 5 mLs, " "Oral, Every 6 hours PRN    tadalafiL (CIALIS) 10 mg, Oral, Daily PRN        Review of patient's allergies indicates:  No Known Allergies    Objective:     Visit Vitals  BP (!) 148/95 (BP Location: Right arm, Patient Position: Sitting, BP Method: Large (Manual))   Pulse 96   Temp 98.4 °F (36.9 °C) (Oral)   Resp 18   Ht 5' 7" (1.702 m)   Wt (!) 157.1 kg (346 lb 6.4 oz)   SpO2 97%   BMI 54.25 kg/m²       Physical Examination:     Physical Exam  Constitutional:       General: He is not in acute distress.     Appearance: Normal appearance. He is obese. He is not ill-appearing.   Cardiovascular:      Rate and Rhythm: Normal rate and regular rhythm.      Heart sounds: Normal heart sounds.   Pulmonary:      Effort: Pulmonary effort is normal. No respiratory distress.      Breath sounds: Normal breath sounds.   Musculoskeletal:      Cervical back: Normal range of motion.   Skin:     General: Skin is warm and dry.   Neurological:      Mental Status: He is alert and oriented to person, place, and time.   Psychiatric:         Mood and Affect: Mood normal.         Behavior: Behavior normal.       Lab Results:     Chemistry:  Lab Results   Component Value Date     03/04/2022    K 4.2 03/04/2022    CHLORIDE 104 03/04/2022    BUN 12.2 03/04/2022    CREATININE 0.87 03/04/2022    GLUCOSE 94 03/04/2022    CALCIUM 9.8 03/04/2022    ALKPHOS 99 03/04/2022    LABPROT 7.5 03/04/2022    ALBUMIN 4.0 03/04/2022    BILIDIR 0.3 03/04/2022    IBILI 0.50 03/04/2022    AST 18 03/04/2022    ALT 20 03/04/2022        No results found for: HGBA1C, MICROALBCREA     Hematology:  Lab Results   Component Value Date    WBC 8.0 03/04/2022    HGB 14.3 03/04/2022    HCT 46.7 03/04/2022     03/04/2022       Lipid Panel:  No results found for: CHOL, HDL, LDL, TRIG, TOTALCHOLEST     Urine:  Lab Results   Component Value Date    COLORUA Yellow 07/25/2022    APPEARANCEUA Clear 07/25/2022    SGUA 1.015 07/25/2022    PHUA 5.0 07/25/2022    PROTEINUA " Negative 07/25/2022    GLUCOSEUA Negative 07/25/2022    KETONESUA Negative 07/25/2022    BLOODUA Negative 07/25/2022    NITRITESUA Negative 07/25/2022    LEUKOCYTESUR Negative 07/25/2022    RBCUA <5 07/25/2022    WBCUA <5 07/25/2022    BACTERIA None Seen 07/25/2022    SQEPUA None Seen 06/16/2022    HYALINECASTS None Seen 06/16/2022        Assessment:          ICD-10-CM ICD-9-CM   1. Hypertension, unspecified type  I10 401.9   2. Encounter for wellness examination  Z00.00 V70.0   3. Morbid (severe) obesity due to excess calories  E66.01 278.01   4. Bilateral foot pain  M79.671 729.5    M79.672    5. Other male erectile dysfunction  N52.8 607.84        Plan:     1. Hypertension, unspecified type  Overview:  Low Sodium Diet (Dash Diet - less than 2 grams of sodium per day).  Monitor Blood Pressure daily and log. Report any consistent numbers greater than 140/90.  Smoking Cessation encouraged to aid in BP reduction.  Maintain healthy weight with goal BMI <30. Exercise 30 minutes per day 5 days per week      Assessment & Plan:  Chronic, uncontrolled. Add amlodipine 5 mg to his daily hctz 25 mg    Orders:  -     amLODIPine (NORVASC) 5 MG tablet; Take 1 tablet (5 mg total) by mouth once daily.  Dispense: 60 tablet; Refill: 0    2. Encounter for wellness examination  -     Vitamin D  -     HIV 1/2 Ag/Ab (4th Gen)  -     Comprehensive Metabolic Panel  -     CBC Auto Differential  -     Lipid Panel  -     Hepatitis Panel, Acute  -     SYPHILIS ANTIBODY (WITH REFLEX RPR)  -     Hemoglobin A1C  -     T4, Free  -     TSH  -     TSH  -     T4, Free  -     Hemoglobin A1C  -     SYPHILIS ANTIBODY (WITH REFLEX RPR)  -     Hepatitis Panel, Acute  -     Lipid Panel  -     CBC Auto Differential  -     Comprehensive Metabolic Panel  -     HIV 1/2 Ag/Ab (4th Gen)  -     Vitamin D  -     Urinalysis, Reflex to Urine Culture    3. Morbid (severe) obesity due to excess calories  Comments:  referral to Nutrition therapy  Orders:  -      Ambulatory referral/consult to Nutrition Services; Future; Expected date: 07/24/2023    4. Bilateral foot pain  Comments:  bilateral foot pain  Orders:  -     Ambulatory referral/consult to Physical/Occupational Therapy; Future; Expected date: 07/24/2023    5. Other male erectile dysfunction  Overview:  Monitor for prolonged erections, if occur go to ER immediately.  You may need to stop smoking or using drugs, drink less alcohol, lose weight, reduce stress, and start exercising. There are pills that may help you get an erection. If these do not work, there are other medicines that can be injected or inserted into your penis. Vacuum pump or inflatable devices may also help you manage your ED.      Assessment & Plan:  Continue Cialis, controlled            Follow up in about 2 weeks (around 8/7/2023) for BP recheck, med change fu.    Future Appointments   Date Time Provider Department Center   [facility]2023 12[facility]        Anum [facility] NP

## 2023-09-23 ENCOUNTER — HOSPITAL ENCOUNTER (EMERGENCY)
Facility: HOSPITAL | Age: 33
Discharge: HOME | End: 2023-09-23
Attending: STUDENT IN AN ORGANIZED HEALTH CARE EDUCATION/TRAINING PROGRAM
Payer: COMMERCIAL

## 2023-09-23 VITALS
TEMPERATURE: 98 F | SYSTOLIC BLOOD PRESSURE: 142 MMHG | HEART RATE: 92 BPM | RESPIRATION RATE: 17 BRPM | DIASTOLIC BLOOD PRESSURE: 86 MMHG | OXYGEN SATURATION: 98 %

## 2023-09-23 DIAGNOSIS — B35.9 TINEA: ICD-10-CM

## 2023-09-23 DIAGNOSIS — M79.672 LEFT FOOT PAIN: ICD-10-CM

## 2023-09-23 DIAGNOSIS — M25.572 LEFT ANKLE PAIN: Primary | ICD-10-CM

## 2023-09-23 PROCEDURE — 25000003 PHARM REV CODE 250: Performed by: PHYSICIAN ASSISTANT

## 2023-09-23 PROCEDURE — 99284 EMERGENCY DEPT VISIT MOD MDM: CPT

## 2023-09-23 RX ORDER — PRENATAL VIT 91/IRON/FOLIC/DHA 28-975-200
COMBINATION PACKAGE (EA) ORAL 2 TIMES DAILY
Qty: 15 G | Refills: 0 | Status: SHIPPED | OUTPATIENT
Start: 2023-09-23

## 2023-09-23 RX ORDER — IBUPROFEN 600 MG/1
600 TABLET ORAL
Status: COMPLETED | OUTPATIENT
Start: 2023-09-23 | End: 2023-09-23

## 2023-09-23 RX ORDER — DICLOFENAC SODIUM 50 MG/1
50 TABLET, DELAYED RELEASE ORAL 3 TIMES DAILY
Qty: 21 TABLET | Refills: 0 | Status: SHIPPED | OUTPATIENT
Start: 2023-09-23 | End: 2023-09-30

## 2023-09-23 RX ADMIN — IBUPROFEN 600 MG: 600 TABLET, FILM COATED ORAL at 02:09

## 2023-09-23 NOTE — DISCHARGE INSTRUCTIONS
Use diclofenac for pain and swelling.  Apply topical antifungal cream to rash.  Follow-up with PCP in 7-10 days as needed.

## 2023-09-23 NOTE — ED PROVIDER NOTES
Encounter Date: 9/23/2023       History     Chief Complaint   Patient presents with    Foot Injury     Pt. C/o right foot pain and swelling started x2-3 days ago with itching.. ambulatory to triage.. no noted redness.. noted old scaring .. Denies trauma     33-year-old male presents to ED for evaluation of right foot pain and swelling for the past 2-3 days.  Complains of an itching rash around his ankle.  Denies any trauma or injury.  Denies any redness.  Denies any fever or chills.  Reports pain worse with movement.    The history is provided by the patient. No  was used.     Review of patient's allergies indicates:  No Known Allergies  Past Medical History:   Diagnosis Date    Depression     Hypertension      No past surgical history on file.  No family history on file.  Social History     Tobacco Use    Smoking status: Never    Smokeless tobacco: Never   Substance Use Topics    Alcohol use: Not Currently    Drug use: Never     Review of Systems   Constitutional:  Negative for chills, fatigue and fever.   HENT:  Negative for sore throat.    Respiratory:  Negative for cough and shortness of breath.    Cardiovascular:  Negative for chest pain.   Gastrointestinal:  Negative for abdominal pain, nausea and vomiting.   Genitourinary:  Negative for dysuria and frequency.   Musculoskeletal:  Positive for myalgias. Negative for back pain and neck pain.   Skin:  Positive for rash.   Neurological:  Negative for dizziness, weakness and headaches.   Hematological:  Does not bruise/bleed easily.   All other systems reviewed and are negative.      Physical Exam     Initial Vitals [09/23/23 1409]   BP Pulse Resp Temp SpO2   (!) 142/86 92 17 98.4 °F (36.9 °C) 98 %      MAP       --         Physical Exam    Nursing note and vitals reviewed.  Constitutional: He appears well-developed. He is cooperative.   HENT:   Head: Normocephalic and atraumatic.   Right Ear: Tympanic membrane and external ear normal.   Left  Ear: Tympanic membrane and external ear normal.   Mouth/Throat: Uvula is midline, oropharynx is clear and moist and mucous membranes are normal. No trismus in the jaw. No uvula swelling.   Eyes: Conjunctivae are normal. Pupils are equal, round, and reactive to light.   Neck: Neck supple.   Normal range of motion.  Cardiovascular:  Normal rate, regular rhythm and normal heart sounds.           Pulmonary/Chest: Breath sounds normal. No respiratory distress. He has no wheezes. He has no rhonchi. He has no rales.   Abdominal: Abdomen is soft. Bowel sounds are normal. There is no abdominal tenderness. There is no rebound and no guarding.   Musculoskeletal:         General: Normal range of motion.      Cervical back: Normal range of motion and neck supple.      Right ankle: Swelling present. No deformity. No tenderness. Normal range of motion.      Comments: Swelling noted to right ankle with dry excoriated skin.  No erythema or drainage.  DP pulses 2+.  Patient has full range of motion.  All other adjacent joints negative.     Neurological: He is alert and oriented to person, place, and time. He has normal strength. No cranial nerve deficit or sensory deficit. GCS score is 15. GCS eye subscore is 4. GCS verbal subscore is 5. GCS motor subscore is 6.   Skin: Skin is warm and dry. Capillary refill takes less than 2 seconds.   Psychiatric: He has a normal mood and affect.         ED Course   Procedures  Labs Reviewed - No data to display       Imaging Results              X-Ray Foot Complete Right (Final result)  Result time 09/23/23 14:32:16   Procedure changed from X-Ray Foot Complete Left     Final result by Harinder Hopper MD (09/23/23 14:32:16)                   Impression:      No acute osseous finding.      Electronically signed by: Harinder Hopper MD  Date:    09/23/2023  Time:    14:32               Narrative:    EXAMINATION:  Right foot three views    CLINICAL  HISTORY:  Pain    COMPARISON:  None    FINDINGS:  There is no acute fracture subluxation seen.  The joint spaces are maintained.  No erosive or proliferative changes.  No focal soft tissue swelling.                                       X-Ray Ankle Complete Right (Final result)  Result time 09/23/23 14:31:40   Procedure changed from X-Ray Ankle Complete Left     Final result by Harinder Hopper MD (09/23/23 14:31:40)                   Impression:      No acute osseous finding.      Electronically signed by: Harinder Hopper MD  Date:    09/23/2023  Time:    14:31               Narrative:    EXAMINATION:  Right ankle three views    CLINICAL HISTORY:  Pain    COMPARISON:  None    FINDINGS:  There is no acute fracture subluxation.  There is lateral ankle soft tissue swelling.  Ankle mortise maintained.  No erosive or proliferative changes.                                       Medications   ibuprofen tablet 600 mg (600 mg Oral Given 9/23/23 1427)     Medical Decision Making  33-year-old male presents to ED for evaluation of right foot pain and swelling for the past 2-3 days.  Complains of an itching rash around his ankle.  Denies any trauma or injury.  Denies any redness.  Denies any fever or chills.  Reports pain worse with movement.  Will place on topical Lamisil to cover for fungal infection.  No signs of bacterial infection no erythema or warmth.  Patient has no pitting edema.  Will give short course of NSAIDs for pain and swelling.  Return ED precautions given.  Patient verbalizes understanding.    Amount and/or Complexity of Data Reviewed  Radiology: ordered.    Risk  Prescription drug management.                               Clinical Impression:   Final diagnoses:  [M25.572] Left ankle pain (Primary)  [M79.672] Left foot pain  [B35.9] Tinea        ED Disposition Condition    Discharge Stable          ED Prescriptions       Medication Sig Dispense Start Date End Date Auth. Provider    diclofenac (VOLTAREN) 50 MG  EC tablet Take 1 tablet (50 mg total) by mouth 3 (three) times daily. for 7 days 21 tablet 9/23/2023 9/30/2023 Ashanti Hernandez PA    terbinafine HCL (LAMISIL AT) 1 % cream Apply topically 2 (two) times daily. 15 g 9/23/2023 -- Ashanti Hernandez PA          Follow-up Information       Follow up With Specialties Details Why Contact Info    Jazzy-Anum Thacker NP Family Medicine   [address] [place] [id number] [phone]               Ashanti Hernandez PA  09/23/23 [id number]

## 2023-10-05 ENCOUNTER — HOSPITAL ENCOUNTER (EMERGENCY)
Facility: HOSPITAL | Age: 33
Discharge: HOME | End: 2023-10-05
Attending: EMERGENCY MEDICINE
Payer: COMMERCIAL

## 2023-10-05 VITALS
OXYGEN SATURATION: 98 % | HEART RATE: 82 BPM | DIASTOLIC BLOOD PRESSURE: 87 MMHG | TEMPERATURE: 98 F | RESPIRATION RATE: 20 BRPM | SYSTOLIC BLOOD PRESSURE: 150 MMHG | BODY MASS INDEX: 54.19 KG/M2 | WEIGHT: 315 LBS

## 2023-10-05 DIAGNOSIS — M54.9 BACK PAIN: ICD-10-CM

## 2023-10-05 DIAGNOSIS — L03.312 CELLULITIS OF BACK EXCEPT BUTTOCK: Primary | ICD-10-CM

## 2023-10-05 PROCEDURE — 63600175 PHARM REV CODE 636 W HCPCS: Performed by: NURSE PRACTITIONER

## 2023-10-05 PROCEDURE — 25000003 PHARM REV CODE 250

## 2023-10-05 PROCEDURE — 96372 THER/PROPH/DIAG INJ SC/IM: CPT | Performed by: NURSE PRACTITIONER

## 2023-10-05 PROCEDURE — 99284 EMERGENCY DEPT VISIT MOD MDM: CPT

## 2023-10-05 RX ORDER — KETOROLAC TROMETHAMINE 30 MG/ML
30 INJECTION, SOLUTION INTRAMUSCULAR; INTRAVENOUS
Status: DISCONTINUED | OUTPATIENT
Start: 2023-10-05 | End: 2023-10-05

## 2023-10-05 RX ORDER — SULFAMETHOXAZOLE AND TRIMETHOPRIM 800; 160 MG/1; MG/1
1 TABLET ORAL
Status: COMPLETED | OUTPATIENT
Start: 2023-10-05 | End: 2023-10-05

## 2023-10-05 RX ORDER — KETOROLAC TROMETHAMINE 30 MG/ML
60 INJECTION, SOLUTION INTRAMUSCULAR; INTRAVENOUS
Status: COMPLETED | OUTPATIENT
Start: 2023-10-05 | End: 2023-10-05

## 2023-10-05 RX ORDER — KETOROLAC TROMETHAMINE 10 MG/1
10 TABLET, FILM COATED ORAL EVERY 6 HOURS
Qty: 20 TABLET | Refills: 0 | Status: SHIPPED | OUTPATIENT
Start: 2023-10-05 | End: 2023-10-10

## 2023-10-05 RX ORDER — SULFAMETHOXAZOLE AND TRIMETHOPRIM 800; 160 MG/1; MG/1
1 TABLET ORAL 2 TIMES DAILY
Qty: 20 TABLET | Refills: 0 | Status: SHIPPED | OUTPATIENT
Start: 2023-10-05 | End: 2023-10-15

## 2023-10-05 RX ADMIN — SULFAMETHOXAZOLE AND TRIMETHOPRIM 1 TABLET: 800; 160 TABLET ORAL at 01:10

## 2023-10-05 RX ADMIN — KETOROLAC TROMETHAMINE 60 MG: 60 INJECTION, SOLUTION INTRAMUSCULAR at 11:10

## 2023-10-05 NOTE — FIRST PROVIDER EVALUATION
Medical screening examination initiated.  I have conducted a focused provider triage encounter, findings are as follows:    Brief history of present illness:  Patient states left upper back pain x 3-4 days. Denies any injury or trauma.     There were no vitals filed for this visit.    Pertinent physical exam:  Awake, alert, ambulatory      Brief workup plan:  Imaging    Preliminary workup initiated; this workup will be continued and followed by the physician or advanced practice provider that is assigned to the patient when roomed.

## 2023-10-05 NOTE — DISCHARGE INSTRUCTIONS
First dose of antibiotics and anti-inflammatory given in ER.  As soon as you are able, pickup medications intake ketorolac every 6 hours as needed for pain.  Take antibiotic twice daily for 10 days.    Keep area clean and dry. Okay to wash with soap and water.  Keep the area dry.  May apply warm compresses as needed for relief.      Return to ER if fever develops or drainage begins to come from site.

## 2023-10-05 NOTE — ED PROVIDER NOTES
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  Pt reports having an abscess, no abscess. Tender to left rib. Rom intact. Denies sob, cp     See MDM for details         Review of patient's allergies indicates:  No Known Allergies  Past Medical History:   Diagnosis Date    Depression     Hypertension      No past surgical history on file.  No family history on file.  Social History     Tobacco Use    Smoking status: Never    Smokeless tobacco: Never   Substance Use Topics    Alcohol use: Not Currently    Drug use: Never     Review of Systems   Constitutional:  Negative for chills and fever.   Respiratory:  Negative for shortness of breath.    Cardiovascular:  Negative for chest pain.   Gastrointestinal:  Negative for abdominal pain.   Musculoskeletal:  Negative for back pain.   Skin:  Negative for color change and wound.        Left side pain   All other systems reviewed and are negative.      Physical Exam     Initial Vitals   BP Pulse Resp Temp SpO2   10/05/23 1030 10/05/23 1029 10/05/23 1029 10/05/23 1029 10/05/23 1029   (!) 158/96 86 19 98.4 °F (36.9 °C) 97 %      MAP       --                Physical Exam    Nursing note and vitals reviewed.  Constitutional: He appears well-developed. No distress.   Obese.   HENT:   Head: Normocephalic and atraumatic.   Eyes: Conjunctivae and EOM are normal.   Cardiovascular:  Normal rate, regular rhythm and normal heart sounds.           Pulmonary/Chest: Breath sounds normal. No respiratory distress.   Abdominal: Abdomen is soft.   Musculoskeletal:         General: Normal range of motion.      Comments: No spinous process tenderness.  No paraspinal muscle tenderness.     Neurological: He is alert and oriented to person, place, and time. GCS score is 15. GCS eye subscore is 4. GCS verbal subscore is 5. GCS motor subscore is 6.   Skin: Skin is warm and dry.    Area of induration to skin fold on left chest wall.  No signs of skin color changes or drainage.  No signs of fluctuance beneath the skin.  No significant warmth.  Tender to palpation.   Psychiatric: He has a normal mood and affect. Thought content normal.         ED Course   Procedures  Labs Reviewed - No data to display       Imaging Results              X-Ray Thoracic Spine AP Lateral (Final result)  Result time 10/05/23 12:28:45      Final result by Lillian Helton MD (10/05/23 12:28:45)                   Impression:      No appreciable acute osseous abnormality by plain radiographic evaluation.      Electronically signed by: Lillian Helton  Date:    10/05/2023  Time:    12:28               Narrative:    EXAMINATION:  XR THORACIC SPINE AP LATERAL    CLINICAL HISTORY:  Dorsalgia, unspecified    TECHNIQUE:  3 views of the thoracic spine were performed.    COMPARISON:  None.    FINDINGS:  BONES: Vertebral body heights are maintained. Alignment is normal.    DISCS: Disc spaces are preserved.    SOFT TISSUES: Regional soft tissues unremarkable. Lung volumes are low with associated atelectatic change and vascular crowding.                                       Medications   ketorolac injection 60 mg (60 mg Intramuscular Given 10/5/23 1121)   sulfamethoxazole-trimethoprim 800-160mg per tablet 1 tablet (1 tablet Oral Given 10/5/23 1346)     Medical Decision Making  33-year-old male presents to the ED for evaluation of worsening left side pain x4 days. Patient reports that he noticed the pain following lifting something heavy at work. He reports the pain is localized to his left side, within skin fold.  The patient describes the pain as intermittent and varying in character.  He reports worsening pain with movement and palpation.  He reports using icy hot patches without relief.  Patient denies any fever or chills.  He denies any numbness or tingling.  Of note, the patient reports a history of a left sided  abscess drainage within skin fold x1 year ago.    X-ray imaging of thoracic spine negative for any acute abnormalities. Based on physical exam and body habitus, it appears that the patient has early signs of cellulitis due to induration and tenderness to palpation.  There is no signs of abscess formation or fluctuance which would require an I and D at this time. We will treat area of induration to the left side with Bactrim and Toradol.  IM Toradol given in ED for pain relief.  Bactrim sent to the pharmacy for 10 day therapy.  Discussed keeping the area clean and dry and washing with soap and water regularly.  Patient verbalizes understanding of the diagnosis and the treatment plan.  Return to ER precautions given.    Amount and/or Complexity of Data Reviewed  Radiology: ordered and independent interpretation performed.     Details: Thoracic spine without acute abnormalities    Risk  Prescription drug management.      Additional MDM:   Differential Diagnosis:   Other: The following diagnoses were also considered and will be evaluated: Abscess, Herpes zoster and Cellulitis.                            Clinical Impression:   Final diagnoses:  [M54.9] Back pain  [L03.312] Cellulitis of back except buttock (Primary)        ED Disposition Condition    Discharge Stable          ED Prescriptions       Medication Sig Dispense Start Date End Date Auth. Provider    ketorolac (TORADOL) 10 mg tablet Take 1 tablet (10 mg total) by mouth every 6 (six) hours. for 5 days 20 tablet 10/5/2023 10/10/2023 Clementina Valerio PA    sulfamethoxazole-trimethoprim 800-160mg (BACTRIM DS) 800-160 mg Tab Take 1 tablet by mouth 2 (two) times daily. for 10 days 20 tablet 10/5/2023 10/15/2023 Clementina Valerio PA          Follow-up Information       Follow up With Specialties Details Why Contact Info    Anum Ward NP Family Medicine In 2 weeks As needed, If symptoms worsen [facility] [facility] [address] [phone]                Clementina Valerio PA  10/05/23 8162

## 2023-10-30 ENCOUNTER — TELEPHONE (OUTPATIENT)
Dept: FAMILY MEDICINE | Facility: CLINIC | Age: 33
End: 2023-10-30
Payer: COMMERCIAL

## 2023-10-30 NOTE — TELEPHONE ENCOUNTER
Called and spoke with patient. Verified . Spoke with patient regarding recent blood pressure check. States does not check his BP at home. States went to dentist last week and BP reading was 145/87.

## 2023-11-08 ENCOUNTER — PATIENT OUTREACH (OUTPATIENT)
Dept: ADMINISTRATIVE | Facility: HOSPITAL | Age: 33
End: 2023-11-08
Payer: COMMERCIAL

## 2023-11-08 NOTE — PROGRESS NOTES
BP Outreach. Pt BP elevated at last appointment. Attempt made to contact patient for remote BP. Pt states he does not have a BP monitor at home. Informed pt  that we have a program that would provide him a BP monitor free of charge. Pt is interested. Message forwarded to pcp. Pt aware.

## 2023-11-17 ENCOUNTER — HOSPITAL ENCOUNTER (EMERGENCY)
Facility: HOSPITAL | Age: 33
Discharge: HOME | End: 2023-11-17
Attending: STUDENT IN AN ORGANIZED HEALTH CARE EDUCATION/TRAINING PROGRAM
Payer: COMMERCIAL

## 2023-11-17 VITALS
RESPIRATION RATE: 20 BRPM | SYSTOLIC BLOOD PRESSURE: 151 MMHG | BODY MASS INDEX: 49.44 KG/M2 | OXYGEN SATURATION: 97 % | HEIGHT: 67 IN | TEMPERATURE: 98 F | WEIGHT: 315 LBS | DIASTOLIC BLOOD PRESSURE: 95 MMHG | HEART RATE: 88 BPM

## 2023-11-17 DIAGNOSIS — R10.84 GENERALIZED ABDOMINAL PAIN: ICD-10-CM

## 2023-11-17 DIAGNOSIS — K59.00 CONSTIPATION, UNSPECIFIED CONSTIPATION TYPE: Primary | ICD-10-CM

## 2023-11-17 LAB
ALBUMIN SERPL-MCNC: 3.9 G/DL (ref 3.5–5)
ALBUMIN/GLOB SERPL: 1.3 RATIO (ref 1.1–2)
ALP SERPL-CCNC: 86 UNIT/L (ref 40–150)
ALT SERPL-CCNC: 20 UNIT/L (ref 0–55)
APPEARANCE UR: CLEAR
AST SERPL-CCNC: 19 UNIT/L (ref 5–34)
BACTERIA #/AREA URNS AUTO: NORMAL /HPF
BASOPHILS # BLD AUTO: 0.04 X10(3)/MCL
BASOPHILS NFR BLD AUTO: 0.5 %
BILIRUB SERPL-MCNC: 0.6 MG/DL
BILIRUB UR QL STRIP.AUTO: NEGATIVE
BUN SERPL-MCNC: 10.6 MG/DL (ref 8.9–20.6)
CALCIUM SERPL-MCNC: 9.7 MG/DL (ref 8.4–10.2)
CHLORIDE SERPL-SCNC: 104 MMOL/L (ref 98–107)
CO2 SERPL-SCNC: 27 MMOL/L (ref 22–29)
COLOR UR AUTO: NORMAL
CREAT SERPL-MCNC: 0.92 MG/DL (ref 0.73–1.18)
EOSINOPHIL # BLD AUTO: 0.32 X10(3)/MCL (ref 0–0.9)
EOSINOPHIL NFR BLD AUTO: 4 %
ERYTHROCYTE [DISTWIDTH] IN BLOOD BY AUTOMATED COUNT: 14.8 % (ref 11.5–17)
GFR SERPLBLD CREATININE-BSD FMLA CKD-EPI: >60 MLS/MIN/1.73/M2
GLOBULIN SER-MCNC: 3.1 GM/DL (ref 2.4–3.5)
GLUCOSE SERPL-MCNC: 102 MG/DL (ref 74–100)
GLUCOSE UR QL STRIP.AUTO: NORMAL
HCT VFR BLD AUTO: 41.4 % (ref 42–52)
HGB BLD-MCNC: 12.8 G/DL (ref 14–18)
IMM GRANULOCYTES # BLD AUTO: 0.04 X10(3)/MCL (ref 0–0.04)
IMM GRANULOCYTES NFR BLD AUTO: 0.5 %
KETONES UR QL STRIP.AUTO: NEGATIVE
LEUKOCYTE ESTERASE UR QL STRIP.AUTO: NEGATIVE
LIPASE SERPL-CCNC: 10 U/L
LYMPHOCYTES # BLD AUTO: 1.67 X10(3)/MCL (ref 0.6–4.6)
LYMPHOCYTES NFR BLD AUTO: 20.9 %
MCH RBC QN AUTO: 24 PG (ref 27–31)
MCHC RBC AUTO-ENTMCNC: 30.9 G/DL (ref 33–36)
MCV RBC AUTO: 77.5 FL (ref 80–94)
MONOCYTES # BLD AUTO: 0.49 X10(3)/MCL (ref 0.1–1.3)
MONOCYTES NFR BLD AUTO: 6.1 %
NEUTROPHILS # BLD AUTO: 5.43 X10(3)/MCL (ref 2.1–9.2)
NEUTROPHILS NFR BLD AUTO: 68 %
NITRITE UR QL STRIP.AUTO: NEGATIVE
NRBC BLD AUTO-RTO: 0 %
PH UR STRIP.AUTO: 7 [PH]
PLATELET # BLD AUTO: 308 X10(3)/MCL (ref 130–400)
PMV BLD AUTO: 9.9 FL (ref 7.4–10.4)
POTASSIUM SERPL-SCNC: 3.6 MMOL/L (ref 3.5–5.1)
PROT SERPL-MCNC: 7 GM/DL (ref 6.4–8.3)
PROT UR QL STRIP.AUTO: NEGATIVE
RBC # BLD AUTO: 5.34 X10(6)/MCL (ref 4.7–6.1)
RBC #/AREA URNS AUTO: NORMAL /HPF
RBC UR QL AUTO: NEGATIVE
SODIUM SERPL-SCNC: 140 MMOL/L (ref 136–145)
SP GR UR STRIP.AUTO: 1.01 (ref 1–1.03)
SQUAMOUS #/AREA URNS LPF: NORMAL /HPF
UROBILINOGEN UR STRIP-ACNC: NORMAL
WBC # SPEC AUTO: 7.99 X10(3)/MCL (ref 4.5–11.5)
WBC #/AREA URNS AUTO: NORMAL /HPF

## 2023-11-17 PROCEDURE — 85025 COMPLETE CBC W/AUTO DIFF WBC: CPT

## 2023-11-17 PROCEDURE — 80053 COMPREHEN METABOLIC PANEL: CPT

## 2023-11-17 PROCEDURE — 99285 EMERGENCY DEPT VISIT HI MDM: CPT | Mod: 25

## 2023-11-17 PROCEDURE — 25500020 PHARM REV CODE 255

## 2023-11-17 PROCEDURE — 81001 URINALYSIS AUTO W/SCOPE: CPT

## 2023-11-17 PROCEDURE — 83690 ASSAY OF LIPASE: CPT

## 2023-11-17 RX ORDER — LACTULOSE 10 G/15ML
10 SOLUTION ORAL 2 TIMES DAILY PRN
Qty: 150 ML | Refills: 0 | Status: SHIPPED | OUTPATIENT
Start: 2023-11-17 | End: 2023-11-22

## 2023-11-17 RX ADMIN — IOPAMIDOL 100 ML: 755 INJECTION, SOLUTION INTRAVENOUS at 06:11

## 2023-11-17 NOTE — ED PROVIDER NOTES
"Encounter Date: 11/17/2023       History     Chief Complaint   Patient presents with    Abdominal Pain     C/o generalized abdominal pain worsening over the past few days with associated constipation, states "I think its been a few weeks since I had a BM."     The patient is a 33 y.o. male with a history of hypertension who presents to the Emergency Department with a chief complaint of generalized abdominal pain. Symptoms began 3 days ago and have been constant since onset. His pain is currently rated as a 5/10 in severity and described as aching with no radiation. Associated symptoms include constipation. Symptoms are aggravated with nothing and there are no alleviating factors. The patient denies nausea, vomiting, diarrhea, fever, or chills. He reports taking nothing prior to arrival with no relief of symptoms. Patient reports last bowel movement was a few days ago. No other reported symptoms at this time     The history is provided by the patient. No  was used.   Abdominal Pain  The current episode started several days ago. The onset of the illness was gradual. The problem has not changed since onset.The abdominal pain is generalized. The abdominal pain does not radiate. The severity of the abdominal pain is 5/10. The abdominal pain is relieved by nothing. The other symptoms of the illness do not include fever, shortness of breath, nausea, vomiting, diarrhea or dysuria.   Symptoms associated with the illness do not include urgency, frequency or back pain.     Review of patient's allergies indicates:  No Known Allergies  Past Medical History:   Diagnosis Date    Depression     Hypertension      History reviewed. No pertinent surgical history.  History reviewed. No pertinent family history.  Social History     Tobacco Use    Smoking status: Never    Smokeless tobacco: Never   Substance Use Topics    Alcohol use: Not Currently    Drug use: Never     Review of Systems   Constitutional:  Negative " for fever.   HENT:  Negative for congestion, postnasal drip and sore throat.    Respiratory:  Negative for chest tightness and shortness of breath.    Cardiovascular:  Negative for chest pain.   Gastrointestinal:  Positive for abdominal pain. Negative for diarrhea, nausea and vomiting.   Genitourinary:  Negative for dysuria, frequency and urgency.   Musculoskeletal:  Negative for back pain.   Skin:  Negative for rash.   Neurological:  Negative for weakness.   Hematological:  Does not bruise/bleed easily.   All other systems reviewed and are negative.      Physical Exam     Initial Vitals [11/17/23 1625]   BP Pulse Resp Temp SpO2   (!) 151/95 88 20 97.5 °F (36.4 °C) 97 %      MAP       --         Physical Exam    Nursing note and vitals reviewed.  Constitutional: Vital signs are normal. He appears well-developed and well-nourished.   HENT:   Head: Normocephalic.   Right Ear: Hearing and tympanic membrane normal.   Left Ear: Hearing and tympanic membrane normal.   Mouth/Throat: Uvula is midline, oropharynx is clear and moist and mucous membranes are normal.   Eyes: Conjunctivae and EOM are normal. Pupils are equal, round, and reactive to light.   Cardiovascular:  Normal rate, regular rhythm, normal heart sounds and normal pulses.           Pulmonary/Chest: Effort normal and breath sounds normal.   Abdominal: Abdomen is soft. Bowel sounds are normal. There is abdominal tenderness.   Musculoskeletal:      Cervical back: No spinous process tenderness or muscular tenderness.     Lymphadenopathy:     He has no cervical adenopathy.   Neurological: He is alert. GCS eye subscore is 4. GCS verbal subscore is 5. GCS motor subscore is 6.   Skin: Skin is warm, dry and intact. Capillary refill takes less than 2 seconds.         ED Course   Procedures  Labs Reviewed   COMPREHENSIVE METABOLIC PANEL - Abnormal; Notable for the following components:       Result Value    Glucose Level 102 (*)     All other components within normal  limits   CBC WITH DIFFERENTIAL - Abnormal; Notable for the following components:    Hgb 12.8 (*)     Hct 41.4 (*)     MCV 77.5 (*)     MCH 24.0 (*)     MCHC 30.9 (*)     All other components within normal limits   LIPASE - Normal   URINALYSIS, REFLEX TO URINE CULTURE - Normal   CBC W/ AUTO DIFFERENTIAL    Narrative:     The following orders were created for panel order CBC W/ AUTO DIFFERENTIAL.  Procedure                               Abnormality         Status                     ---------                               -----------         ------                     CBC with Differential[[id number]]       Abnormal            Final result                 Please view results for these tests on the individual orders.          Imaging Results              CT Abdomen Pelvis With IV Contrast (Final result)  Result time 11/17/23 18:37:51      Final result by Tj Houston MD (11/17/23 18:37:51)                   Impression:      1. Mild bladder wall thickening, question cystitis.  2. Otherwise no acute abdominopelvic findings.      Electronically signed by: Tj Houston  Date:    11/17/2023  Time:    18:37               Narrative:    EXAMINATION:  CT ABDOMEN PELVIS WITH IV CONTRAST    CLINICAL HISTORY:  Abdominal pain, acute, nonlocalized;    TECHNIQUE:  Helical acquisition through the abdomen and pelvis with IV contrast.  Three plane reconstructions were provided for review. DLP 1049 mGycm. Automatic exposure control, adjustment of mA/kV or iterative reconstruction technique was used to reduce radiation.    COMPARISON:  No prior CT    FINDINGS:  Motion degraded scan.    Lung bases clear.    The liver, spleen, gallbladder, pancreas, adrenals and kidneys are within normal limits.    No bowel obstruction. No significant inflammatory changes of the bowel.  Normal appendix.  There is a small paraumbilical hernia.    Mild bladder wall thickening.  No pelvic free fluid.  Abdominal aorta normal in caliber.    No acute osseous  findings.                                       X-Ray Abdomen Flat And Erect (Final result)  Result time 11/17/23 17:05:40      Final result by Yesenia Jenkins MD (11/17/23 17:05:40)                   Impression:      There is no abnormality seen      Electronically signed by: Yesenia Jenkins  Date:    11/17/2023  Time:    17:05               Narrative:    EXAMINATION:  XR ABDOMEN FLAT AND ERECT    CLINICAL HISTORY:  Abdominal Pain;    TECHNIQUE:  Flat and erect AP views of the abdomen were performed.    COMPARISON:  None    FINDINGS:  The bowel gas pattern is nonspecific.  No free air seen.  No free fluid is seen.  No evidence of organomegaly is seen.  No abnormal calcifications are seen.  No bony abnormality is seen.                                       Medications   iopamidoL (ISOVUE-370) injection 100 mL (100 mLs Intravenous Given 11/17/23 1832)     Medical Decision Making  The patient is a 33 y.o. male with a history of hypertension who presents to the Emergency Department with a chief complaint of generalized abdominal pain. Symptoms began 3 days ago and have been constant since onset. His pain is currently rated as a 5/10 in severity and described as aching with no radiation. Associated symptoms include constipation. Symptoms are aggravated with nothing and there are no alleviating factors. The patient denies nausea, vomiting, diarrhea, fever, or chills. He reports taking nothing prior to arrival with no relief of symptoms. No other reported symptoms at this time     Differential diagnosis include but are not limited to gastroenteritis, constipation, pancreatitis    Problems Addressed:  Constipation, unspecified constipation type: acute illness or injury  Generalized abdominal pain: acute illness or injury    Amount and/or Complexity of Data Reviewed  Radiology: ordered.    Risk  Prescription drug management.               ED Course as of 11/17/23 1916 Fri Nov 17, 2023 1904 I discussed results  in detail with the patient including follow up.  CT abdomen and pelvis shows no acute findings.  Labs unremarkable.  Will discharge patient home with strict ER return precautions. [LM]      ED Course User Index  [LM] Oralia Haines NP                        Clinical Impression:  Final diagnoses:  [K59.00] Constipation, unspecified constipation type (Primary)  [R10.84] Generalized abdominal pain          ED Disposition Condition    Discharge Stable          ED Prescriptions       Medication Sig Dispense Start Date End Date Auth. Provider    lactulose (CHRONULAC) 20 gram/30 mL Soln Take 15 mLs (10 g total) by mouth 2 (two) times daily as needed (Constipation). 150 mL 11/17/2023 11/22/2023 Oralia Haines NP          Follow-up Information       Follow up With Specialties Details Why Contact Info    Please contact [phone] to establish care with a primary care provider  Schedule an appointment as soon as possible for a visit                Oralia Haines NP  11/17/23 6569

## 2023-11-17 NOTE — Clinical Note
"Ryan"Jessie Mcbride was seen and treated in our emergency department on 11/17/2023.  He may return to work on 11/18/2023.       If you have any questions or concerns, please don't hesitate to call.       LPN    "

## 2023-11-17 NOTE — FIRST PROVIDER EVALUATION
"Medical screening examination initiated.  I have conducted a focused provider triage encounter, findings are as follows:    Brief history of present illness:  arrived to ED due to abdominal pain for several days. Denies n/v/d. Lbm several weeks ago.     Vitals:    11/17/23 1625   BP: (!) 151/95   Pulse: 88   Resp: 20   Temp: 97.5 °F (36.4 °C)   TempSrc: Oral   SpO2: 97%   Weight: (!) 156.9 kg (346 lb)   Height: 5' 7" (1.702 m)       Pertinent physical exam:  awake, alert, has non-labored breathing.     Brief workup plan:  labs & imaging    Preliminary workup initiated; this workup will be continued and followed by the physician or advanced practice provider that is assigned to the patient when roomed.  "

## 2023-12-28 ENCOUNTER — PATIENT OUTREACH (OUTPATIENT)
Dept: ADMINISTRATIVE | Facility: HOSPITAL | Age: 33
End: 2023-12-28
Payer: COMMERCIAL

## 2023-12-28 NOTE — PROGRESS NOTES
Population Health BP Outreach attempt to close gap for uncontrolled BP. Pt states no BP monitor at home. Interested in [employer].  Msg to pcp 11/8/2023.

## 2024-03-04 ENCOUNTER — HOSPITAL ENCOUNTER (EMERGENCY)
Facility: HOSPITAL | Age: 34
Discharge: HOME | End: 2024-03-04
Attending: STUDENT IN AN ORGANIZED HEALTH CARE EDUCATION/TRAINING PROGRAM
Payer: MEDICAID

## 2024-03-04 VITALS
HEART RATE: 102 BPM | RESPIRATION RATE: 20 BRPM | TEMPERATURE: 101 F | SYSTOLIC BLOOD PRESSURE: 159 MMHG | DIASTOLIC BLOOD PRESSURE: 99 MMHG | WEIGHT: 315 LBS | OXYGEN SATURATION: 97 % | HEIGHT: 67 IN | BODY MASS INDEX: 49.44 KG/M2

## 2024-03-04 DIAGNOSIS — U07.1 COVID-19: Primary | ICD-10-CM

## 2024-03-04 LAB
FLUAV AG UPPER RESP QL IA.RAPID: NOT DETECTED
FLUBV AG UPPER RESP QL IA.RAPID: NOT DETECTED
SARS-COV-2 RNA RESP QL NAA+PROBE: DETECTED

## 2024-03-04 PROCEDURE — 25000003 PHARM REV CODE 250: Performed by: NURSE PRACTITIONER

## 2024-03-04 PROCEDURE — 0240U COVID/FLU A&B PCR: CPT | Performed by: NURSE PRACTITIONER

## 2024-03-04 PROCEDURE — 99282 EMERGENCY DEPT VISIT SF MDM: CPT

## 2024-03-04 RX ORDER — ACETAMINOPHEN 500 MG
1000 TABLET ORAL
Status: COMPLETED | OUTPATIENT
Start: 2024-03-04 | End: 2024-03-04

## 2024-03-04 RX ADMIN — ACETAMINOPHEN 1000 MG: 500 TABLET ORAL at 05:03

## 2024-03-04 NOTE — ED PROVIDER NOTES
Encounter Date: 3/4/2024       History     Chief Complaint   Patient presents with    Cough     Coughing and headache with fatigue for several days. Denies fever.      See MDM    The history is provided by the patient. No  was used.     Review of patient's allergies indicates:  No Known Allergies  Past Medical History:   Diagnosis Date    Depression     Hypertension      No past surgical history on file.  No family history on file.  Social History     Tobacco Use    Smoking status: Never    Smokeless tobacco: Never   Substance Use Topics    Alcohol use: Not Currently    Drug use: Never     Review of Systems   Constitutional:  Positive for fever.   HENT:  Positive for congestion.    Respiratory:  Positive for cough.    All other systems reviewed and are negative.      Physical Exam     Initial Vitals [03/04/24 1607]   BP Pulse Resp Temp SpO2   (!) 159/99 102 20 (!) 100.5 °F (38.1 °C) 97 %      MAP       --         Physical Exam    Nursing note and vitals reviewed.  Constitutional: He appears well-developed and well-nourished.   HENT:   Right Ear: Tympanic membrane and ear canal normal.   Left Ear: Tympanic membrane and ear canal normal.   Mouth/Throat: No oropharyngeal exudate, posterior oropharyngeal edema, posterior oropharyngeal erythema or tonsillar abscesses.   Eyes: Conjunctivae are normal.   Cardiovascular:  Normal rate, regular rhythm and normal heart sounds.           Pulmonary/Chest: Breath sounds normal. No respiratory distress.   +cough   Musculoskeletal:         General: Normal range of motion.     Neurological: He is alert and oriented to person, place, and time.   Skin: Skin is warm and dry.   Psychiatric: He has a normal mood and affect.         ED Course   Procedures  Labs Reviewed   COVID/FLU A&B PCR - Abnormal; Notable for the following components:       Result Value    SARS-CoV-2 PCR Detected (*)     All other components within normal limits    Narrative:     The Xpert Xpress  SARS-CoV-2/FLU/RSV plus is a rapid, multiplexed real-time PCR test intended for the simultaneous qualitative detection and differentiation of SARS-CoV-2, Influenza A, Influenza B, and respiratory syncytial virus (RSV) viral RNA in either nasopharyngeal swab or nasal swab specimens.                Imaging Results    None          Medications   acetaminophen tablet 1,000 mg (1,000 mg Oral Given 3/4/24 1706)     Medical Decision Making  33 y/o male presents with cough, congestion, fever for the past few days and fatigue.     Covid +. Flu neg.  Treated fever here.     Amount and/or Complexity of Data Reviewed  Labs: ordered. Decision-making details documented in ED Course.    Risk  OTC drugs.      Additional MDM:   Differential Diagnosis:   Other: The following diagnoses were also considered and will be evaluated: covid, flu and URI.                                   Clinical Impression:  Final diagnoses:  [U07.1] COVID-19 (Primary)          ED Disposition Condition    Discharge Stable          ED Prescriptions    None       Follow-up Information       Follow up With Specialties Details Why Contact Info    primary care provider  Call in 1 week As needed              Harriet Tran FNP  03/04/24 3609

## 2024-03-04 NOTE — Clinical Note
"Ryan"Jessie Mcbride was seen and treated in our emergency department on 3/4/2024.  He may return to work on 03/08/2024.       If you have any questions or concerns, please don't hesitate to call.      Harriet Tran, YURI"

## 2024-03-06 ENCOUNTER — PATIENT OUTREACH (OUTPATIENT)
Dept: EMERGENCY MEDICINE | Facility: HOSPITAL | Age: 34
End: 2024-03-06
Payer: MEDICAID

## 2024-03-06 NOTE — PROGRESS NOTES
Patient discharged from the ED on 3/4/24 for COVID. Patient agreed to scheduling a post ED 7-day follow up with PCP NP Anum Ward on 3/12/24 at 12 pm. Patient will receive an appointment reminder.

## 2024-03-08 ENCOUNTER — HOSPITAL ENCOUNTER (EMERGENCY)
Facility: HOSPITAL | Age: 34
Discharge: HOME | End: 2024-03-08
Attending: EMERGENCY MEDICINE
Payer: MEDICAID

## 2024-03-08 VITALS
DIASTOLIC BLOOD PRESSURE: 105 MMHG | WEIGHT: 315 LBS | OXYGEN SATURATION: 95 % | HEART RATE: 94 BPM | HEIGHT: 67 IN | BODY MASS INDEX: 49.44 KG/M2 | SYSTOLIC BLOOD PRESSURE: 168 MMHG | RESPIRATION RATE: 18 BRPM | TEMPERATURE: 98 F

## 2024-03-08 DIAGNOSIS — I10 PRIMARY HYPERTENSION: ICD-10-CM

## 2024-03-08 DIAGNOSIS — K43.9 VENTRAL HERNIA WITHOUT OBSTRUCTION OR GANGRENE: Primary | ICD-10-CM

## 2024-03-08 LAB
ALBUMIN SERPL-MCNC: 4 G/DL (ref 3.5–5)
ALBUMIN/GLOB SERPL: 1 RATIO (ref 1.1–2)
ALP SERPL-CCNC: 90 UNIT/L (ref 40–150)
ALT SERPL-CCNC: 24 UNIT/L (ref 0–55)
APPEARANCE UR: CLEAR
AST SERPL-CCNC: 19 UNIT/L (ref 5–34)
BACTERIA #/AREA URNS AUTO: ABNORMAL /HPF
BASOPHILS # BLD AUTO: 0.04 X10(3)/MCL
BASOPHILS NFR BLD AUTO: 0.5 %
BILIRUB SERPL-MCNC: 0.6 MG/DL
BILIRUB UR QL STRIP.AUTO: NEGATIVE
BUN SERPL-MCNC: 10 MG/DL (ref 8.9–20.6)
CALCIUM SERPL-MCNC: 9.8 MG/DL (ref 8.4–10.2)
CHLORIDE SERPL-SCNC: 104 MMOL/L (ref 98–107)
CO2 SERPL-SCNC: 26 MMOL/L (ref 22–29)
COLOR UR AUTO: YELLOW
CREAT SERPL-MCNC: 1.14 MG/DL (ref 0.73–1.18)
EOSINOPHIL # BLD AUTO: 0.22 X10(3)/MCL (ref 0–0.9)
EOSINOPHIL NFR BLD AUTO: 3 %
ERYTHROCYTE [DISTWIDTH] IN BLOOD BY AUTOMATED COUNT: 14.9 % (ref 11.5–17)
GFR SERPLBLD CREATININE-BSD FMLA CKD-EPI: >60 MLS/MIN/1.73/M2
GLOBULIN SER-MCNC: 3.9 GM/DL (ref 2.4–3.5)
GLUCOSE SERPL-MCNC: 141 MG/DL (ref 74–100)
GLUCOSE UR QL STRIP.AUTO: NEGATIVE
HCT VFR BLD AUTO: 44 % (ref 42–52)
HGB BLD-MCNC: 13.9 G/DL (ref 14–18)
IMM GRANULOCYTES # BLD AUTO: 0.03 X10(3)/MCL (ref 0–0.04)
IMM GRANULOCYTES NFR BLD AUTO: 0.4 %
KETONES UR QL STRIP.AUTO: NEGATIVE
LEUKOCYTE ESTERASE UR QL STRIP.AUTO: NEGATIVE
LYMPHOCYTES # BLD AUTO: 1.68 X10(3)/MCL (ref 0.6–4.6)
LYMPHOCYTES NFR BLD AUTO: 22.6 %
MCH RBC QN AUTO: 23.8 PG (ref 27–31)
MCHC RBC AUTO-ENTMCNC: 31.6 G/DL (ref 33–36)
MCV RBC AUTO: 75.3 FL (ref 80–94)
MONOCYTES # BLD AUTO: 0.4 X10(3)/MCL (ref 0.1–1.3)
MONOCYTES NFR BLD AUTO: 5.4 %
NEUTROPHILS # BLD AUTO: 5.05 X10(3)/MCL (ref 2.1–9.2)
NEUTROPHILS NFR BLD AUTO: 68.1 %
NITRITE UR QL STRIP.AUTO: NEGATIVE
NRBC BLD AUTO-RTO: 0 %
PH UR STRIP.AUTO: 6 [PH]
PLATELET # BLD AUTO: 323 X10(3)/MCL (ref 130–400)
PMV BLD AUTO: 9.9 FL (ref 7.4–10.4)
POTASSIUM SERPL-SCNC: 3.7 MMOL/L (ref 3.5–5.1)
PROT SERPL-MCNC: 7.9 GM/DL (ref 6.4–8.3)
PROT UR QL STRIP.AUTO: 30
RBC # BLD AUTO: 5.84 X10(6)/MCL (ref 4.7–6.1)
RBC #/AREA URNS AUTO: ABNORMAL /HPF
RBC UR QL AUTO: NEGATIVE
SODIUM SERPL-SCNC: 139 MMOL/L (ref 136–145)
SP GR UR STRIP.AUTO: 1.02 (ref 1–1.03)
SQUAMOUS #/AREA URNS AUTO: ABNORMAL /HPF
UROBILINOGEN UR STRIP-ACNC: 0.2
WBC # SPEC AUTO: 7.42 X10(3)/MCL (ref 4.5–11.5)
WBC #/AREA URNS AUTO: ABNORMAL /HPF

## 2024-03-08 PROCEDURE — 99284 EMERGENCY DEPT VISIT MOD MDM: CPT | Mod: 25

## 2024-03-08 PROCEDURE — 81003 URINALYSIS AUTO W/O SCOPE: CPT | Performed by: EMERGENCY MEDICINE

## 2024-03-08 PROCEDURE — 85025 COMPLETE CBC W/AUTO DIFF WBC: CPT | Performed by: EMERGENCY MEDICINE

## 2024-03-08 PROCEDURE — 25000003 PHARM REV CODE 250: Performed by: EMERGENCY MEDICINE

## 2024-03-08 PROCEDURE — 80053 COMPREHEN METABOLIC PANEL: CPT | Performed by: EMERGENCY MEDICINE

## 2024-03-08 RX ORDER — BUTALBITAL, ACETAMINOPHEN AND CAFFEINE 50; 325; 40 MG/1; MG/1; MG/1
1 TABLET ORAL
Status: COMPLETED | OUTPATIENT
Start: 2024-03-08 | End: 2024-03-08

## 2024-03-08 RX ORDER — HYDROCODONE BITARTRATE AND ACETAMINOPHEN 7.5; 325 MG/1; MG/1
1 TABLET ORAL EVERY 6 HOURS PRN
Qty: 12 TABLET | Refills: 0 | Status: SHIPPED | OUTPATIENT
Start: 2024-03-08 | End: 2024-06-06 | Stop reason: CLARIF

## 2024-03-08 RX ORDER — BUTALBITAL, ACETAMINOPHEN AND CAFFEINE 50; 325; 40 MG/1; MG/1; MG/1
1 TABLET ORAL EVERY 6 HOURS PRN
Qty: 20 TABLET | Refills: 0 | Status: SHIPPED | OUTPATIENT
Start: 2024-03-08 | End: 2024-03-12

## 2024-03-08 RX ORDER — CLONIDINE HYDROCHLORIDE 0.1 MG/1
0.1 TABLET ORAL
Status: COMPLETED | OUTPATIENT
Start: 2024-03-08 | End: 2024-03-08

## 2024-03-08 RX ADMIN — CLONIDINE HYDROCHLORIDE 0.1 MG: 0.1 TABLET ORAL at 07:03

## 2024-03-08 RX ADMIN — BUTALBITAL, ACETAMINOPHEN, AND CAFFEINE 1 TABLET: 50; 325; 40 TABLET ORAL at 07:03

## 2024-03-08 NOTE — Clinical Note
"Ryan"Jessie Mcbride was seen and treated in our emergency department on 3/8/2024.  He may return to work on 03/12/2024.       If you have any questions or concerns, please don't hesitate to call.      Michaela Smith MD"

## 2024-03-09 NOTE — ED TRIAGE NOTES
Abdominal Pain Patient is covid positive. Having abdominal pain in the middle. Constipation for 5 days.        Hypertension States he is out of his meds for months.

## 2024-03-09 NOTE — ED PROVIDER NOTES
Encounter Date: 3/8/2024       History     Chief Complaint   Patient presents with    Abdominal Pain     Patient is covid positive. Having abdominal pain in the middle. Constipation for 5 days.     Hypertension     States he is out of his meds for months.      34-year-old male with a history of COVID-19 infection on March 4th, hypertension, depression, obesity, ventral hernia complains of generalized abdominal pain for the last 5 days with constipation.  He states he is having pain in the area of the hernia.  He also has had a nosebleed twice and has been having a headache in the temples since the Covid dx on 3-4.  No chest pain or shortness of breath.  No fever or cough.  He feels like he is mostly recovered from the COVID-19 infection.        Review of patient's allergies indicates:  No Known Allergies  Past Medical History:   Diagnosis Date    COVID-19 03/04/2024    Depression     Hypertension     Obesity, unspecified      No past surgical history on file.  No family history on file.  Social History     Tobacco Use    Smoking status: Never    Smokeless tobacco: Never   Substance Use Topics    Alcohol use: Not Currently    Drug use: Never     Review of Systems   HENT:  Positive for nosebleeds.    Gastrointestinal:  Positive for abdominal pain.   All other systems reviewed and are negative.      Physical Exam     Initial Vitals [03/08/24 1855]   BP Pulse Resp Temp SpO2   (!) 227/158 90 18 98.1 °F (36.7 °C) 98 %      MAP       --         Physical Exam    Nursing note and vitals reviewed.  Constitutional: Vital signs are normal. He appears well-developed and well-nourished.   HENT:   Head: Normocephalic and atraumatic.   Right Ear: External ear normal.   Left Ear: External ear normal.   Mouth/Throat: Oropharynx is clear and moist.   TMs are normal, no sign of active nosebleed and no visible abrasions inside his nose, small amount of dried blood noted on the end of his nose.   Eyes: Pupils are equal, round, and  reactive to light.   Neck: Neck supple. No JVD present.   Cardiovascular:  Normal rate, regular rhythm and normal heart sounds.           Pulmonary/Chest: Breath sounds normal. No respiratory distress.   Abdominal: Abdomen is soft. There is abdominal tenderness.   Palpable ventral hernia which is not reducible, moderately tender, no rebound or guarding, rest of abdominal exam is negative   Musculoskeletal:         General: No edema.      Cervical back: Neck supple. No edema or erythema.     Lymphadenopathy:     He has no cervical adenopathy.   Neurological: He is alert and oriented to person, place, and time. No cranial nerve deficit. GCS score is 15. GCS eye subscore is 4. GCS verbal subscore is 5. GCS motor subscore is 6.   Skin: Skin is warm and dry. Capillary refill takes less than 2 seconds.         ED Course   Procedures  Labs Reviewed   COMPREHENSIVE METABOLIC PANEL - Abnormal; Notable for the following components:       Result Value    Glucose Level 141 (*)     Globulin 3.9 (*)     Albumin/Globulin Ratio 1.0 (*)     All other components within normal limits   URINALYSIS, REFLEX TO URINE CULTURE - Abnormal; Notable for the following components:    Protein, UA 30 (*)     All other components within normal limits   CBC WITH DIFFERENTIAL - Abnormal; Notable for the following components:    Hgb 13.9 (*)     MCV 75.3 (*)     MCH 23.8 (*)     MCHC 31.6 (*)     All other components within normal limits   URINALYSIS, MICROSCOPIC - Abnormal; Notable for the following components:    Squamous Epithelial Cells, UA Few (*)     All other components within normal limits   CBC W/ AUTO DIFFERENTIAL    Narrative:     The following orders were created for panel order CBC auto differential.  Procedure                               Abnormality         Status                     ---------                               -----------         ------                     CBC with Differential[[id number]]       Abnormal            Final  result                 Please view results for these tests on the individual orders.          Imaging Results              CT Abdomen Pelvis  Without Contrast (Final result)  Result time 03/08/24 20:02:23      Final result by Juan Antonio Dunacn MD (03/08/24 20:02:23)                   Impression:      1. Supraumbilical midline hernia containing mesenteric fat with interval progression.  The herniated fat is partially heterogeneously dense consistent with fat ischemia or scarring.    2. No other acute findings.      Electronically signed by: Juan Antonio Duncan  Date:    03/08/2024  Time:    20:02               Narrative:    EXAMINATION:  CT ABDOMEN PELVIS WITHOUT CONTRAST    CLINICAL HISTORY:  Abdominal pain, acute, nonlocalized;ventral hernia;    TECHNIQUE:  Multidetector axial images were obtained from the  diaphragms to below symphysis pubis without the administration of IV contrast. Oral contrast was not administered.    Dose length product of 880 mGycm. Automated exposure control was utilized to minimize radiation dose.    COMPARISON:  November 17, 2023.    FINDINGS:  Included lungs are without suspicious nodularity, acute air space infiltrates or fluid within the pleural spaces.    Within limitations of noncontrast technique, no acute findings of the liver, pancreas and spleen identified. Gallbladder wall is not thickened and there is no intraluminal calcified calculus. No apparent biliary dilation.    The adrenal glands noncontrast evaluation is unremarkable. The kidneys are unremarkable in size and contour. There is no hydronephrosis or nephrolithiasis. The ureters appear normal in course and diameter without intra ureteral stone.    There is supraumbilical midline ventral hernia which contains the mesenteric fat.  The herniated portion of the fat shows interval progression and there are increased associated densities reflective of ischemia or scarring.  No hernia sac fluid collection identified.    Stomach is  unremarkable.  No abnormal dilatation of loops of small bowel and there is no focal or generalized mural thickening.  Appendix is unremarkable.  Colon is nondistended there are no pericolonic acute strandings.  No free fluid.  No bowel obstruction    Urinary bladder entirely decompressed.  No pelvic free fluid..  No intravesical stone identified. There is no pelvic free fluid.    No acute or aggressive skeletal abnormality.                                       Medications   butalbital-acetaminophen-caffeine -40 mg per tablet 1 tablet (1 tablet Oral Given 3/8/24 1956)   cloNIDine tablet 0.1 mg (0.1 mg Oral Given 3/8/24 1956)     Medical Decision Making  See HPI for narrative    Differential diagnosis includes but is not limited to COVID-19 infection, gastroenteritis, incarcerated hernia    Amount and/or Complexity of Data Reviewed  Labs: ordered.  Radiology: ordered.  Discussion of management or test interpretation with external provider(s): Case discussed with Dr. Wilks on-call for General surgery at [facility].  She states that generally mesenteric fat hernia is managed as an outpatient and it should be safe to discharge him with pain medication.  However, if the pain is not manageable at home or if he is having intractable nausea vomiting or other concerns, they will address it more urgently and he should go to the emergency room at [facility]. Dr Wilks recommends outpatient referral to General Surgery.  Dr Lynn on call mary does not take outpatient referrals but Dr Foy does.    Risk  Prescription drug management.               ED Course as of 03/09/24 0320   Fri Mar 08, 2024   2105 Diagnosis and follow-up was discussed with the patient.  He understands to go to [facility] should the pain in his abdomen become more severe.  He complained of epistaxis and he did have some dried blood on his face but no current nosebleed. [SH]      ED Course User  Index  [SH] Michaela Smith MD                           Clinical Impression:  Final diagnoses:  [K43.9] Ventral hernia without obstruction or gangrene (Primary)  [I10] Primary hypertension          ED Disposition Condition    Discharge Stable          ED Prescriptions       Medication Sig Dispense Start Date End Date Auth. Provider    HYDROcodone-acetaminophen (NORCO) 7.5-325 mg per tablet Take 1 tablet by mouth every 6 (six) hours as needed for Pain. 12 tablet 3/8/2024 -- Michaela Smith MD    butalbital-acetaminophen-caffeine -40 mg (FIORICET, ESGIC) -40 mg per tablet Take 1 tablet by mouth every 6 (six) hours as needed for Headaches. 20 tablet 3/8/2024 4/7/2024 Michaela Smith MD          Follow-up Information       Follow up With Specialties Details Why Contact Info    Anum Ward NP Family Medicine Schedule an appointment as soon as possible for a visit   [address] [place] [address] [id number] [phone]               Michaela Smith MD  03/09/24 9338

## 2024-03-12 ENCOUNTER — OFFICE VISIT (OUTPATIENT)
Dept: FAMILY MEDICINE | Facility: CLINIC | Age: 34
End: 2024-03-12
Payer: MEDICAID

## 2024-03-12 VITALS
OXYGEN SATURATION: 98 % | TEMPERATURE: 98 F | HEIGHT: 67 IN | RESPIRATION RATE: 18 BRPM | BODY MASS INDEX: 49.44 KG/M2 | HEART RATE: 80 BPM | DIASTOLIC BLOOD PRESSURE: 104 MMHG | WEIGHT: 315 LBS | SYSTOLIC BLOOD PRESSURE: 158 MMHG

## 2024-03-12 DIAGNOSIS — K59.00 CONSTIPATION, UNSPECIFIED CONSTIPATION TYPE: ICD-10-CM

## 2024-03-12 DIAGNOSIS — K45.8 OTHER SPECIFIED ABDOMINAL HERNIA WITHOUT OBSTRUCTION OR GANGRENE: Primary | ICD-10-CM

## 2024-03-12 DIAGNOSIS — I10 HYPERTENSION, UNSPECIFIED TYPE: ICD-10-CM

## 2024-03-12 DIAGNOSIS — E66.01 MORBID OBESITY: ICD-10-CM

## 2024-03-12 DIAGNOSIS — I10 PRIMARY HYPERTENSION: ICD-10-CM

## 2024-03-12 DIAGNOSIS — N52.8 OTHER MALE ERECTILE DYSFUNCTION: Chronic | ICD-10-CM

## 2024-03-12 PROBLEM — K43.9 VENTRAL HERNIA WITHOUT OBSTRUCTION OR GANGRENE: Status: ACTIVE | Noted: 2024-03-12

## 2024-03-12 PROCEDURE — 1159F MED LIST DOCD IN RCRD: CPT | Mod: CPTII,,,

## 2024-03-12 PROCEDURE — 3077F SYST BP >= 140 MM HG: CPT | Mod: CPTII,,,

## 2024-03-12 PROCEDURE — 3008F BODY MASS INDEX DOCD: CPT | Mod: CPTII,,,

## 2024-03-12 PROCEDURE — 3080F DIAST BP >= 90 MM HG: CPT | Mod: CPTII,,,

## 2024-03-12 PROCEDURE — 99215 OFFICE O/P EST HI 40 MIN: CPT | Mod: PBBFAC,PN

## 2024-03-12 PROCEDURE — 99214 OFFICE O/P EST MOD 30 MIN: CPT | Mod: S$PBB,,,

## 2024-03-12 PROCEDURE — 1160F RVW MEDS BY RX/DR IN RCRD: CPT | Mod: CPTII,,,

## 2024-03-12 RX ORDER — AMLODIPINE BESYLATE 5 MG/1
5 TABLET ORAL DAILY
Qty: 90 TABLET | Refills: 3 | Status: SHIPPED | OUTPATIENT
Start: 2024-03-12 | End: 2024-04-16 | Stop reason: SDUPTHER

## 2024-03-12 RX ORDER — HYDROCHLOROTHIAZIDE 25 MG/1
25 TABLET ORAL DAILY
Qty: 90 TABLET | Refills: 3 | Status: SHIPPED | OUTPATIENT
Start: 2024-03-12 | End: 2025-03-12

## 2024-03-12 RX ORDER — LACTULOSE 10 G/15ML
SOLUTION ORAL
COMMUNITY
Start: 2023-11-18 | End: 2024-03-12

## 2024-03-12 RX ORDER — TADALAFIL 10 MG/1
10 TABLET ORAL DAILY PRN
Qty: 30 TABLET | Refills: 5 | Status: SHIPPED | OUTPATIENT
Start: 2024-03-12 | End: 2025-03-12

## 2024-03-12 RX ORDER — POLYETHYLENE GLYCOL 3350 17 G/17G
17 POWDER, FOR SOLUTION ORAL DAILY PRN
Qty: 100 EACH | Refills: 0 | Status: SHIPPED | OUTPATIENT
Start: 2024-03-12 | End: 2024-06-06 | Stop reason: CLARIF

## 2024-03-12 NOTE — ASSESSMENT & PLAN NOTE
Stable and well controlled, amlodipine 5 mg and hctz 25 mg. Continue current medication. Limit salt in diet. Monitor BP and notify clinic for consistently elevated BP >140/90.

## 2024-03-12 NOTE — ASSESSMENT & PLAN NOTE
BMI Body mass index is 52.08 kg/m².   Goal BMI <30.  Exercise 5 times a week for 30 minutes per day.  Avoid soda, simple sugars, excessive rice, potatoes or bread. Limit fast foods and fried foods.  Choose complex carbs in moderation (example: green vegetables, beans, oatmeal). Eat plenty of fresh fruits and vegetables with lean meats daily.  Do not skip meals. Eat a balanced portion size.  Avoid fad diets. Consider permanent healthy life style changes.       BMI 52.08

## 2024-03-12 NOTE — ASSESSMENT & PLAN NOTE
Report to ED with any nausea or vomiting, protrusion of hernia that cannot be reduced, severe sudden pain, or hernia that turns red, purple or dark.  Avoid heavy lifting, straining with bowel movements or other activities that increase intra-abdominal pressure.   Maintain a healthy weight.   Refer to Surgery Clinic for evaluation.

## 2024-03-12 NOTE — PROGRESS NOTES
"Patient Name: Ryan Mcbride     : 1990    MRN: [id number]     Subjective:     Patient ID: Ryan Mcbride is a 34 y.o. male.    Chief Complaint:   Chief Complaint   Patient presents with    Follow-up     F/u appointment.  recently had COVID.  has a hernia that will require surgery.  was given medicine for the pain.  was told if Rx med helps he will not need surgery.  has been out of BP Rx "for quite some time"        HPI: 2024: patient presents after rescheduled/missed appointments over concerns of hernia, states that he went to ED and was told the pain medication would make it go away. Has not been referred to surgery clinic, discussed weight may be issue. Pain is dull, intermittent. Lump is reducible. Pt has no symptoms of  chronic constipation, chronic cough. Denies severe sudden pain, or hernia that turns red, purple or dark.    2023:  Patient presents to clinic today to follow-up on new medications started last office visit.  Patient has been taking Wellbutrin, states that he is upset because he has not lost any weight yet.  At length discussion with patient about mechanism of action of Wellbutrin.  He denies any complications from medications such as SI/HI, yen or hallucinations.  Patient is amenable to referral to nutrition therapy.  He did attend a zoom session for bariatric surgery class and states that he does not think he will be able to take 2 weeks off of work as his wife "sleeps all the time so nothing will get done".  He was unable to get Ozempic. Also complains bilateral foot pain, does not have shoes with insoles. Does have feeling in his feet, no wounds. No known injury     2023:  Patient presents to clinic today with concerns of his weight gain, wanting to switch his medicine from Viagra to something else.  Patient also has history of hyperlipidemia as well as hypertension.  At length discussion with patient today about importance of " getting blood work.  Patient never completed any previous blood work ordered in system.  We will attempt to collect those labs today.  Does have history of erectile dysfunction, complaining that that is not improved.  He is never started taking Viagra that was previously prescribed to him states that he heard bad things about it so he never tried it.  He is open to trying a different medication today.  Patient also complaining of weight gain and inability to lose weight.  Patient denies following any specific diet, denies routine physical exercise.  He is amenable to trial of Ozempic if covered by insurance as well as referral to bariatric surgery.  Patient denies any personal or family history of thyroid medullary cancers.  Patient does have history of prediabetes.    12/06/2022: Patient went to  yesterday with complaints of knee pain and cough and congestion. He is complaining again today of the same symptoms and has yet to  his medication due to financial concerns. Encouraged patient to  medication. He is hypertensive in clinic today, manual BP recheck was still elevated. He is amenable to adding medication to control this as he endorses frequent head aches. Denies CP, blurred vision, SOB, Edema or palpitation.  Additionally patient is requesting refill of his Celexa, states that he has been on Celexa for over 3 years.    06/16/2022:  Today about his weight and increased urination, we can screen him for diabetes today as this has never been done.  He is still self pay and has not yet obtained insurance.  He is hypertensive in clinic today denies headache, blurred vision, chest pain or shortness of breath.  Patient is getting  on Saturday in is concerned about his erectile dysfunction.  Patient also went to [place] [facility] to try and help him lose weight.  States that he does not have time to work out because he works from 11-8.  At length discussion with patient about healthy eating habits and  "exercise.     3/8/22: Patient went to the emergency department on March 4 for left chest pain on evaluation was found to have abscess under left breast. This was drained by the surgery department. Patient returned to the emergency department on March 5 to pull the packing and clean. I&D noted to be clean dry and in "better comparison to previous note" patient is following up with surgery clinic today at 11 AM. He is taking ibuprofen for the pain and was prescribed Bactrim 800/160 mg which she has been compliant with. Patient also on previous emergency department visits restarted on blood pressure medicine he is taking hydrochlorothiazide 12.5 mg daily. Denies blurred vision chest pain palpitations or shortness of breath. Patient recently had CBC/CMP in ED with no other preventative screenings previously performed. Patient concerned about his ability to lose weight, states that "all these medicines he is taking" are causing him to gain weight. Patient has combined all of his medicines that he has been prescribed from the emergency departments over the past 2 months into 1 pill bottle. Educated on importance of keeping medications in original pill bottle and medication safety. Reeducated patient that he does not need to take his as needed NSAIDs and muscle relaxers and less he is in pain. Educated patient that the February 25 weight reading of 291 might of possibly been a miscalculation because 2 weeks prior on February 13 he weighed 324 pounds which is more consistent with his weight reading of 325 pounds today.        ROS:      12 point review of systems conducted, negative except as stated in the history of present illness. See HPI for details.    History:     Past Medical History:   Diagnosis Date    COVID-19 03/04/2024    Depression     Hypertension     Obesity, unspecified         History reviewed. No pertinent surgical history.    History reviewed. No pertinent family history.     Social History     Tobacco Use " "   Smoking status: Never    Smokeless tobacco: Never   Substance and Sexual Activity    Alcohol use: Not Currently    Drug use: Never    Sexual activity: Yes     Partners: Female       Current Outpatient Medications   Medication Instructions    amLODIPine (NORVASC) 5 mg, Oral, Daily    buPROPion (WELLBUTRIN XL) 150 mg, Oral, Daily    hydroCHLOROthiazide (HYDRODIURIL) 25 mg, Oral, Daily    HYDROcodone-acetaminophen (NORCO) 7.5-325 mg per tablet 1 tablet, Oral, Every 6 hours PRN    levocetirizine (XYZAL) 5 mg, Oral, Nightly    polyethylene glycol (GLYCOLAX) 17 g, Oral, Daily PRN    tadalafiL (CIALIS) 10 mg, Oral, Daily PRN    terbinafine HCL (LAMISIL AT) 1 % cream Topical (Top), 2 times daily        Review of patient's allergies indicates:   Allergen Reactions    Grass pollen-june grass standard Other (See Comments)     Pollen. States becomes "very irritable"        Objective:     Visit Vitals  BP (!) 158/104 (BP Location: Right arm, Patient Position: Sitting)   Pulse 80   Temp 98.1 °F (36.7 °C) (Oral)   Resp 18   Ht 5' 7" (1.702 m)   Wt (!) 150.8 kg (332 lb 8 oz)   SpO2 98%   BMI 52.08 kg/m²       Physical Examination:     Physical Exam  Constitutional:       General: He is not in acute distress.     Appearance: Normal appearance. He is obese. He is not ill-appearing.   Cardiovascular:      Rate and Rhythm: Normal rate and regular rhythm.      Heart sounds: Normal heart sounds.   Pulmonary:      Effort: Pulmonary effort is normal. No respiratory distress.      Breath sounds: Normal breath sounds.   Abdominal:      Hernia: A hernia is present.   Musculoskeletal:      Cervical back: Normal range of motion.   Skin:     General: Skin is warm and dry.   Neurological:      Mental Status: He is alert and oriented to person, place, and time.   Psychiatric:         Mood and Affect: Mood normal.         Behavior: Behavior normal.         Lab Results:     Chemistry:  Lab Results   Component Value Date     03/08/2024    K " "3.7 03/08/2024    CHLORIDE 104 03/08/2024    BUN 10.0 03/08/2024    CREATININE 1.14 03/08/2024    EGFRNORACEVR >60 03/08/2024    GLUCOSE 141 (H) 03/08/2024    CALCIUM 9.8 03/08/2024    ALKPHOS 90 03/08/2024    LABPROT 7.9 03/08/2024    ALBUMIN 4.0 03/08/2024    BILIDIR 0.3 03/04/2022    IBILI 0.50 03/04/2022    AST 19 03/08/2024    ALT 24 03/08/2024        No results found for: "HGBA1C", "MICROALBCREA"     Hematology:  Lab Results   Component Value Date    WBC 7.42 03/08/2024    HGB 13.9 (L) 03/08/2024    HCT 44.0 03/08/2024     03/08/2024       Lipid Panel:  No results found for: "CHOL", "HDL", "LDL", "TRIG", "TOTALCHOLEST"     Urine:  Lab Results   Component Value Date    COLORUA Yellow 03/08/2024    APPEARANCEUA Clear 03/08/2024    SGUA 1.020 03/08/2024    PHUA 6.0 03/08/2024    PROTEINUA 30 (A) 03/08/2024    GLUCOSEUA Negative 03/08/2024    KETONESUA Negative 03/08/2024    BLOODUA Negative 03/08/2024    NITRITESUA Negative 03/08/2024    LEUKOCYTESUR Negative 03/08/2024    RBCUA None Seen 03/08/2024    WBCUA 0-5 03/08/2024    BACTERIA Occasional 03/08/2024    SQEPUA None Seen 11/17/2023    HYALINECASTS None Seen 07/24/2023        Assessment:          ICD-10-CM ICD-9-CM   1. Other specified abdominal hernia without obstruction or gangrene  K45.8 553.8   2. Hypertension, unspecified type  I10 401.9   3. Primary hypertension  I10 401.9   4. Other male erectile dysfunction  N52.8 607.84   5. Constipation, unspecified constipation type  K59.00 564.00   6. Morbid obesity  E66.01 278.01        Plan:     1. Other specified abdominal hernia without obstruction or gangrene  Assessment & Plan:  Report to ED with any nausea or vomiting, protrusion of hernia that cannot be reduced, severe sudden pain, or hernia that turns red, purple or dark.  Avoid heavy lifting, straining with bowel movements or other activities that increase intra-abdominal pressure.   Maintain a healthy weight.   Refer to Surgery Clinic for " evaluation.        Orders:  -     Ambulatory referral/consult to General Surgery; Future; Expected date: 03/12/2024  -     Ambulatory referral/consult to General Surgery; Future; Expected date: 03/12/2024    2. Hypertension, unspecified type  Overview:  Low Sodium Diet (Dash Diet - less than 2 grams of sodium per day).  Monitor Blood Pressure daily and log. Report any consistent numbers greater than 140/90.  Smoking Cessation encouraged to aid in BP reduction.  Maintain healthy weight with goal BMI <30. Exercise 30 minutes per day 5 days per week      Assessment & Plan:  Stable and well controlled, amlodipine 5 mg and hctz 25 mg. Continue current medication. Limit salt in diet. Monitor BP and notify clinic for consistently elevated BP >140/90.      Orders:  -     amLODIPine (NORVASC) 5 MG tablet; Take 1 tablet (5 mg total) by mouth once daily.  Dispense: 90 tablet; Refill: 3    3. Primary hypertension  Overview:  Low Sodium Diet (Dash Diet - less than 2 grams of sodium per day).  Monitor Blood Pressure daily and log. Report any consistent numbers greater than 140/90.  Smoking Cessation encouraged to aid in BP reduction.  Maintain healthy weight with goal BMI <30. Exercise 30 minutes per day 5 days per week      Assessment & Plan:  Stable and well controlled, amlodipine 5 mg and hctz 25 mg. Continue current medication. Limit salt in diet. Monitor BP and notify clinic for consistently elevated BP >140/90.      Orders:  -     hydroCHLOROthiazide (HYDRODIURIL) 25 MG tablet; Take 1 tablet (25 mg total) by mouth once daily.  Dispense: 90 tablet; Refill: 3    4. Other male erectile dysfunction  Overview:  Monitor for prolonged erections, if occur go to ER immediately.  You may need to stop smoking or using drugs, drink less alcohol, lose weight, reduce stress, and start exercising. There are pills that may help you get an erection. If these do not work, there are other medicines that can be injected or inserted into your  penis. Vacuum pump or inflatable devices may also help you manage your ED.      Assessment & Plan:  Continue Cialis, controlled     Orders:  -     tadalafiL (CIALIS) 10 MG tablet; Take 1 tablet (10 mg total) by mouth daily as needed for Erectile Dysfunction.  Dispense: 30 tablet; Refill: 5    5. Constipation, unspecified constipation type  Assessment & Plan:  Take miralax and stool softener daily.  Educated on high fiber diet and exercise.  Drink at least 64 ozs of water daily.  Eat hot breakfast in morning to help have BM.      Orders:  -     polyethylene glycol (GLYCOLAX) 17 gram PwPk; Take 17 g by mouth daily as needed for Constipation.  Dispense: 100 each; Refill: 0    6. Morbid obesity  Assessment & Plan:  BMI Body mass index is 52.08 kg/m².   Goal BMI <30.  Exercise 5 times a week for 30 minutes per day.  Avoid soda, simple sugars, excessive rice, potatoes or bread. Limit fast foods and fried foods.  Choose complex carbs in moderation (example: green vegetables, beans, oatmeal). Eat plenty of fresh fruits and vegetables with lean meats daily.  Do not skip meals. Eat a balanced portion size.  Avoid fad diets. Consider permanent healthy life style changes.       BMI 52.08           Follow up if symptoms worsen or fail to improve.    Future Appointments   Date Time Provider Department Center   3/19/2024  2:30 PM Sandor Eckert MD [facility]   4/16/2024  9:30 AM Anum Ward NP [facility]        Anum Ward NP

## 2024-03-12 NOTE — ASSESSMENT & PLAN NOTE
Take miralax and stool softener daily.  Educated on high fiber diet and exercise.  Drink at least 64 ozs of water daily.  Eat hot breakfast in morning to help have BM.

## 2024-03-19 ENCOUNTER — TELEPHONE (OUTPATIENT)
Dept: SURGERY | Facility: CLINIC | Age: 34
End: 2024-03-19

## 2024-03-26 ENCOUNTER — OFFICE VISIT (OUTPATIENT)
Dept: SURGERY | Facility: CLINIC | Age: 34
End: 2024-03-26
Payer: MEDICAID

## 2024-03-26 ENCOUNTER — TELEPHONE (OUTPATIENT)
Dept: SURGERY | Facility: CLINIC | Age: 34
End: 2024-03-26

## 2024-03-26 VITALS
HEART RATE: 93 BPM | SYSTOLIC BLOOD PRESSURE: 157 MMHG | DIASTOLIC BLOOD PRESSURE: 96 MMHG | HEIGHT: 67 IN | WEIGHT: 315 LBS | BODY MASS INDEX: 49.44 KG/M2

## 2024-03-26 DIAGNOSIS — E66.01 MORBID OBESITY WITH BMI OF 50.0-59.9, ADULT: Primary | ICD-10-CM

## 2024-03-26 DIAGNOSIS — K45.8 OTHER SPECIFIED ABDOMINAL HERNIA WITHOUT OBSTRUCTION OR GANGRENE: ICD-10-CM

## 2024-03-26 DIAGNOSIS — K43.9 VENTRAL HERNIA WITHOUT OBSTRUCTION OR GANGRENE: ICD-10-CM

## 2024-03-26 DIAGNOSIS — Z53.1 BLOOD TRANSFUSION DECLINED BECAUSE PATIENT IS JEHOVAH'S WITNESS: ICD-10-CM

## 2024-03-26 PROCEDURE — 99204 OFFICE O/P NEW MOD 45 MIN: CPT | Mod: ,,, | Performed by: SURGERY

## 2024-03-26 PROCEDURE — 3080F DIAST BP >= 90 MM HG: CPT | Mod: CPTII,,, | Performed by: SURGERY

## 2024-03-26 PROCEDURE — 1159F MED LIST DOCD IN RCRD: CPT | Mod: CPTII,,, | Performed by: SURGERY

## 2024-03-26 PROCEDURE — 3008F BODY MASS INDEX DOCD: CPT | Mod: CPTII,,, | Performed by: SURGERY

## 2024-03-26 PROCEDURE — 3077F SYST BP >= 140 MM HG: CPT | Mod: CPTII,,, | Performed by: SURGERY

## 2024-03-26 NOTE — PROGRESS NOTES
[facility] Surgical - General Surgery Services  [address] [place] [id number]  Phone: [phone]  Fax: [phone]     HISTORY & PHYSICAL  General Surgery  Dr. Sandor Eckert      Patient Name: Ryan Mcbride  YOB: 1990  Author: Clementina Frankel, ANP     Date: 03/26/2024                   SUBJECTIVE:     Chief Complaint/Reason for Admission:   Chief Complaint   Patient presents with    Consult     Umbilical hernia        History of Present Illness:  Mr. Ryan Mcbride is a 34 y.o. male who presents to clinic for surgical evaluation of abdominal ventral hernia.     He started noticing a bulge to abdominal wall after lifting heavy pans at work 1 month ago. He works in the cafeteria at the hospital.  Denies any changes to bowel / bladder habits. He denies CP/SOB or fever/chills.   He presented to the ED at [facility] for evaluation on 3/8/2024.    Also interested in learning about bariatric surgery options to lose weight.     Ht. 67 in.  Consult weight 332 #  BMI 52     Patient is a [employer] and declines receiving blood transfusion or blood products.     CT abdomen pelvis 3/8/2024:   Narrative & Impression  EXAMINATION:  CT ABDOMEN PELVIS WITHOUT CONTRAST     CLINICAL HISTORY:  Abdominal pain, acute, nonlocalized;ventral hernia;     TECHNIQUE:  Multidetector axial images were obtained from the  diaphragms to below symphysis pubis without the administration of IV contrast. Oral contrast was not administered.     Dose length product of 880 mGycm. Automated exposure control was utilized to minimize radiation dose.     COMPARISON:  November 17, 2023.     FINDINGS:  Included lungs are without suspicious nodularity, acute air space infiltrates or fluid within the pleural spaces.     Within limitations of noncontrast technique, no acute findings of the liver, pancreas and spleen identified. Gallbladder wall is not thickened and there is no intraluminal calcified  calculus. No apparent biliary dilation.     The adrenal glands noncontrast evaluation is unremarkable. The kidneys are unremarkable in size and contour. There is no hydronephrosis or nephrolithiasis. The ureters appear normal in course and diameter without intra ureteral stone.     There is supraumbilical midline ventral hernia which contains the mesenteric fat.  The herniated portion of the fat shows interval progression and there are increased associated densities reflective of ischemia or scarring.  No hernia sac fluid collection identified.     Stomach is unremarkable.  No abnormal dilatation of loops of small bowel and there is no focal or generalized mural thickening.  Appendix is unremarkable.  Colon is nondistended there are no pericolonic acute strandings.  No free fluid.  No bowel obstruction     Urinary bladder entirely decompressed.  No pelvic free fluid..  No intravesical stone identified. There is no pelvic free fluid.     No acute or aggressive skeletal abnormality.     Impression:     1. Supraumbilical midline hernia containing mesenteric fat with interval progression.  The herniated fat is partially heterogeneously dense consistent with fat ischemia or scarring.     2. No other acute findings.        Electronically signed by: Juan Antonio Duncan  Date:                                            03/08/2024  Time:                                           20:02      PCP: Anum Ward NP     Review of Systems:  12 point ROS negative except as stated in HPI    PAST HISTORY:     Past Medical History:   Diagnosis Date    Allergy 4/25/2010    COVID-19 03/04/2024    Depression     Hypertension     Obesity, unspecified      No past surgical history on file.  Family History   Problem Relation Age of Onset    Diabetes Sister      Social History     Socioeconomic History    Marital status:    Tobacco Use    Smoking status: Never    Smokeless tobacco: Never   Substance and Sexual Activity    Alcohol  use: Not Currently    Drug use: Never    Sexual activity: Yes     Partners: Female     Birth control/protection: None     Social Determinants of Health     Financial Resource Strain: Medium Risk (3/26/2024)    Overall Financial Resource Strain (CARDIA)     Difficulty of Paying Living Expenses: Somewhat hard   Food Insecurity: Food Insecurity Present (3/26/2024)    Hunger Vital Sign     Worried About Running Out of Food in the Last Year: Often true     Ran Out of Food in the Last Year: Often true   Transportation Needs: No Transportation Needs (3/26/2024)    PRAPARE - Transportation     Lack of Transportation (Medical): No     Lack of Transportation (Non-Medical): No   Physical Activity: Unknown (3/26/2024)    Exercise Vital Sign     Days of Exercise per Week: 1 day   Stress: Stress Concern Present (3/26/2024)    [employer] [place] [employer] - Occupational Stress Questionnaire     Feeling of Stress : To some extent   Social Connections: Unknown (3/26/2024)    Social Connection and Isolation Panel [NHANES]     Frequency of Communication with Friends and Family: Three times a week     Frequency of Social Gatherings with Friends and Family: Twice a week     Active Member of Clubs or Organizations: No     Attends Club or Organization Meetings: 1 to 4 times per year     Marital Status:    Housing Stability: Low Risk  (3/26/2024)    Housing Stability Vital Sign     Unable to Pay for Housing in the Last Year: No     Number of Places Lived in the Last Year: 2     Unstable Housing in the Last Year: No       MEDICATIONS & ALLERGIES:     Current Outpatient Medications on File Prior to Visit   Medication Sig    amLODIPine (NORVASC) 5 MG tablet Take 1 tablet (5 mg total) by mouth once daily.    buPROPion (WELLBUTRIN XL) 150 MG TB24 tablet Take 1 tablet (150 mg total) by mouth once daily.    hydroCHLOROthiazide (HYDRODIURIL) 25 MG tablet Take 1 tablet (25 mg total) by mouth once daily.    levocetirizine (XYZAL)  "5 MG tablet Take 1 tablet (5 mg total) by mouth every evening.    polyethylene glycol (GLYCOLAX) 17 gram PwPk Take 17 g by mouth daily as needed for Constipation.    tadalafiL (CIALIS) 10 MG tablet Take 1 tablet (10 mg total) by mouth daily as needed for Erectile Dysfunction.    HYDROcodone-acetaminophen (NORCO) 7.5-325 mg per tablet Take 1 tablet by mouth every 6 (six) hours as needed for Pain. (Patient not taking: Reported on 3/12/2024)    terbinafine HCL (LAMISIL AT) 1 % cream Apply topically 2 (two) times daily. (Patient not taking: Reported on 3/12/2024)     No current facility-administered medications on file prior to visit.     Review of patient's allergies indicates:   Allergen Reactions    Grass pollen-june grass standard Other (See Comments)     Pollen. States becomes "very irritable"        OBJECTIVE:     Vitals:    03/26/24 1427   BP: (!) 157/96   Pulse: 93   Weight: (!) 150.6 kg (332 lb)   Height: 5' 7" (1.702 m)     Body mass index is 52 kg/m².    Physical Exam:  General:  Well developed, well nourished, no acute distress  HEENT:  Normocephalic, atraumatic, PERRL, EOMI, clear sclera, ears normal, neck supple, throat clear without erythema or exudates  CVS:  RRR, S1 and S2 normal, no murmurs, rubs, gallops  Resp:  Lungs clear to auscultation, no wheezes, rales, rhonchi, cough  GI:  ventral hernia reduces easily Soft, BS+  :  Deferred  MSK:  No muscle atrophy, cyanosis, peripheral edema, full range of motion  Skin:  No rashes, ulcers, erythema  Neuro:  CNII-XII grossly intact  Psych:  Alert and oriented to person, place, and time    Results:  I have independently reviewed all pertinent lab and radiologic studies relevant to general surgery.      VISIT DIAGNOSES:       ICD-10-CM ICD-9-CM   1. Morbid obesity with BMI of 50.0-59.9, adult  E66.01 278.01    Z68.43 V85.43   2. Other specified abdominal hernia without obstruction or gangrene  K45.8 553.8   3. Ventral hernia without obstruction or gangrene  " K43.9 553.20   4. Blood transfusion declined because patient is Jain  Z53.1 V62.6       ASSESSMENT/PLAN:     - Submit clinicals to insurance for Workman's Compensation.    - Submit clinicals to insurance for bariatric surgery coverage.    - Refer to non-surgical weight loss program to encourage weight loss prior to ventral hernia repair. High risk of hernia repair failure at current BMI. Would like for patient to lose weight prior to surgery if possible. Pre op goal 40 #.    - Recall 3 months to re-evaluate his weight loss progress.   Dr. Eckert discussed S&S of incarcerated hernia and to call ASAP if these develop and could repair hernia emergently.    Plan:     Weight loss first then ventral hernia repair     Laparoscopic Ventral Hernia repair   Dr. Eckert examined patient, discussed recommendations, obtained informed consent, and answered all questions with patient/family.     Counseling included differential diagnoses, treatment options, risks and benefits, lifestyle changes, and prognosis.  The patient was interactive, and verbalized understanding. Patient was able to ask questions and wishes to proceed.        RAMSES Chahal      I, RAMSES Corley, am scribing for, and in the presence of, Sandor Eckert MD, performed the above scribed service and the documentation accurately describes the services I performed. I attest to the accuracy of the note.

## 2024-03-26 NOTE — TELEPHONE ENCOUNTER
Per  - patient interested in [facility] and possible bariatric surgery. Sending referral      Kitty - please run benefits and contact pt.

## 2024-03-28 ENCOUNTER — TELEPHONE (OUTPATIENT)
Dept: SURGERY | Facility: CLINIC | Age: 34
End: 2024-03-28
Payer: MEDICAID

## 2024-04-16 ENCOUNTER — OFFICE VISIT (OUTPATIENT)
Dept: FAMILY MEDICINE | Facility: CLINIC | Age: 34
End: 2024-04-16
Payer: MEDICAID

## 2024-04-16 VITALS
BODY MASS INDEX: 49.44 KG/M2 | RESPIRATION RATE: 18 BRPM | OXYGEN SATURATION: 97 % | TEMPERATURE: 98 F | HEART RATE: 87 BPM | WEIGHT: 315 LBS | SYSTOLIC BLOOD PRESSURE: 147 MMHG | DIASTOLIC BLOOD PRESSURE: 93 MMHG | HEIGHT: 67 IN

## 2024-04-16 DIAGNOSIS — K45.8 OTHER SPECIFIED ABDOMINAL HERNIA WITHOUT OBSTRUCTION OR GANGRENE: Primary | ICD-10-CM

## 2024-04-16 DIAGNOSIS — K59.00 CONSTIPATION, UNSPECIFIED CONSTIPATION TYPE: ICD-10-CM

## 2024-04-16 DIAGNOSIS — I10 HYPERTENSION, UNSPECIFIED TYPE: ICD-10-CM

## 2024-04-16 PROCEDURE — 1159F MED LIST DOCD IN RCRD: CPT | Mod: CPTII,,,

## 2024-04-16 PROCEDURE — 3075F SYST BP GE 130 - 139MM HG: CPT | Mod: CPTII,,,

## 2024-04-16 PROCEDURE — 1160F RVW MEDS BY RX/DR IN RCRD: CPT | Mod: CPTII,,,

## 2024-04-16 PROCEDURE — 99215 OFFICE O/P EST HI 40 MIN: CPT | Mod: PBBFAC,PN

## 2024-04-16 PROCEDURE — 3080F DIAST BP >= 90 MM HG: CPT | Mod: CPTII,,,

## 2024-04-16 PROCEDURE — 3008F BODY MASS INDEX DOCD: CPT | Mod: CPTII,,,

## 2024-04-16 PROCEDURE — 99214 OFFICE O/P EST MOD 30 MIN: CPT | Mod: S$PBB,,,

## 2024-04-16 RX ORDER — AMLODIPINE BESYLATE 10 MG/1
10 TABLET ORAL DAILY
Qty: 90 TABLET | Refills: 3 | Status: SHIPPED | OUTPATIENT
Start: 2024-04-16 | End: 2024-05-16

## 2024-04-26 NOTE — PROGRESS NOTES
"Patient Name: Ryan Mcbride     : 1990    MRN: [id number]     Subjective:     Patient ID: Ryan Mcbride is a 34 y.o. male.    Chief Complaint:   Chief Complaint   Patient presents with    Follow-up     F/u appointment. BP recheck. States thought was "getting hernia meds" and hasn't received it yet.         HPI: 24:since last office visit patient reported to establish care with surgeon for care of hernia, states he was told he needs to lose weight and that It would be too much of a financial strain. States he is worried because "anything to can happen to him at work" states he could bet "hurt at anytime". Patient complains of constipation. Onset was several days ago. Patient has been having occasional formed stools per week. Defecation has been incomplete. Co-Morbid conditions:obesity. Symptoms have waxed and waned but are worse overall. Current Health Habits: Eating fiber? no, Exercise? no, Adequate hydration? no. Current over the counter/prescription laxative: none which has been effective. Patient denies chest pain, palpitations, and shortness of breath.  Patient denies fever, night sweats, chills, nausea, vomiting, diarrhea, , weight loss, and changes in appetite.      2024: patient presents after rescheduled/missed appointments over concerns of hernia, states that he went to ED and was told the pain medication would make it go away. Has not been referred to surgery clinic, discussed weight may be issue. Pain is dull, intermittent. Lump is reducible. Pt has no symptoms of  chronic constipation, chronic cough. Denies severe sudden pain, or hernia that turns red, purple or dark.    2023:  Patient presents to clinic today to follow-up on new medications started last office visit.  Patient has been taking Wellbutrin, states that he is upset because he has not lost any weight yet.  At length discussion with patient about mechanism of action of Wellbutrin.  He denies any complications " "from medications such as SI/HI, yen or hallucinations.  Patient is amenable to referral to nutrition therapy.  He did attend a zoom session for bariatric surgery class and states that he does not think he will be able to take 2 weeks off of work as his wife "sleeps all the time so nothing will get done".  He was unable to get Ozempic. Also complains bilateral foot pain, does not have shoes with insoles. Does have feeling in his feet, no wounds. No known injury     06/26/2023:  Patient presents to clinic today with concerns of his weight gain, wanting to switch his medicine from Viagra to something else.  Patient also has history of hyperlipidemia as well as hypertension.  At length discussion with patient today about importance of getting blood work.  Patient never completed any previous blood work ordered in system.  We will attempt to collect those labs today.  Does have history of erectile dysfunction, complaining that that is not improved.  He is never started taking Viagra that was previously prescribed to him states that he heard bad things about it so he never tried it.  He is open to trying a different medication today.  Patient also complaining of weight gain and inability to lose weight.  Patient denies following any specific diet, denies routine physical exercise.  He is amenable to trial of Ozempic if covered by insurance as well as referral to bariatric surgery.  Patient denies any personal or family history of thyroid medullary cancers.  Patient does have history of prediabetes.    12/06/2022: Patient went to  yesterday with complaints of knee pain and cough and congestion. He is complaining again today of the same symptoms and has yet to  his medication due to financial concerns. Encouraged patient to  medication. He is hypertensive in clinic today, manual BP recheck was still elevated. He is amenable to adding medication to control this as he endorses frequent head aches. Denies CP, " "blurred vision, SOB, Edema or palpitation.  Additionally patient is requesting refill of his Celexa, states that he has been on Celexa for over 3 years.    06/16/2022:  Today about his weight and increased urination, we can screen him for diabetes today as this has never been done.  He is still self pay and has not yet obtained insurance.  He is hypertensive in clinic today denies headache, blurred vision, chest pain or shortness of breath.  Patient is getting  on Saturday in is concerned about his erectile dysfunction.  Patient also went to [place] [facility] to try and help him lose weight.  States that he does not have time to work out because he works from 11-8.  At length discussion with patient about healthy eating habits and exercise.     3/8/22: Patient went to the emergency department on March 4 for left chest pain on evaluation was found to have abscess under left breast. This was drained by the surgery department. Patient returned to the emergency department on March 5 to pull the packing and clean. I&D noted to be clean dry and in "better comparison to previous note" patient is following up with surgery clinic today at 11 AM. He is taking ibuprofen for the pain and was prescribed Bactrim 800/160 mg which she has been compliant with. Patient also on previous emergency department visits restarted on blood pressure medicine he is taking hydrochlorothiazide 12.5 mg daily. Denies blurred vision chest pain palpitations or shortness of breath. Patient recently had CBC/CMP in ED with no other preventative screenings previously performed. Patient concerned about his ability to lose weight, states that "all these medicines he is taking" are causing him to gain weight. Patient has combined all of his medicines that he has been prescribed from the emergency departments over the past 2 months into 1 pill bottle. Educated on importance of keeping medications in original pill bottle and medication safety. Reeducated " "patient that he does not need to take his as needed NSAIDs and muscle relaxers and less he is in pain. Educated patient that the February 25 weight reading of 291 might of possibly been a miscalculation because 2 weeks prior on February 13 he weighed 324 pounds which is more consistent with his weight reading of 325 pounds today.        ROS:        12 point review of systems conducted, negative except as stated in the history of present illness. See HPI for details.    History:     Past Medical History:   Diagnosis Date    Allergy 4/25/2010    COVID-19 03/04/2024    Depression     Hypertension     Obesity, unspecified         No past surgical history on file.    Family History   Problem Relation Name Age of Onset    Diabetes Sister Robert Cardona         Social History     Tobacco Use    Smoking status: Never    Smokeless tobacco: Never   Substance and Sexual Activity    Alcohol use: Not Currently    Drug use: Never    Sexual activity: Yes     Partners: Female     Birth control/protection: None       Current Outpatient Medications   Medication Instructions    amLODIPine (NORVASC) 10 mg, Oral, Daily    buPROPion (WELLBUTRIN XL) 150 mg, Oral, Daily    hydroCHLOROthiazide (HYDRODIURIL) 25 mg, Oral, Daily    HYDROcodone-acetaminophen (NORCO) 7.5-325 mg per tablet 1 tablet, Oral, Every 6 hours PRN    levocetirizine (XYZAL) 5 mg, Oral, Nightly    polyethylene glycol (GLYCOLAX) 17 g, Oral, Daily PRN    tadalafiL (CIALIS) 10 mg, Oral, Daily PRN    terbinafine HCL (LAMISIL AT) 1 % cream Topical (Top), 2 times daily        Review of patient's allergies indicates:   Allergen Reactions    Grass pollen-june grass standard Other (See Comments)     Pollen. States becomes "very irritable"        Objective:     Visit Vitals  BP (!) 147/93 (BP Location: Right arm, Patient Position: Sitting)   Pulse 87   Temp 97.8 °F (36.6 °C) (Oral)   Resp 18   Ht 5' 7" (1.702 m)   Wt (!) 152 kg (335 lb 3.2 oz)   SpO2 97%   BMI 52.50 kg/m² " "      Physical Examination:     Physical Exam  Constitutional:       General: He is not in acute distress.     Appearance: Normal appearance. He is obese. He is not ill-appearing.   Cardiovascular:      Rate and Rhythm: Normal rate and regular rhythm.      Heart sounds: Normal heart sounds.   Pulmonary:      Effort: Pulmonary effort is normal. No respiratory distress.      Breath sounds: Normal breath sounds.   Abdominal:      Hernia: A hernia is present.   Musculoskeletal:      Cervical back: Normal range of motion.   Skin:     General: Skin is warm and dry.   Neurological:      Mental Status: He is alert and oriented to person, place, and time.   Psychiatric:         Mood and Affect: Mood normal.         Behavior: Behavior normal.         Lab Results:     Chemistry:  Lab Results   Component Value Date     03/08/2024    K 3.7 03/08/2024    CHLORIDE 104 03/08/2024    BUN 10.0 03/08/2024    CREATININE 1.14 03/08/2024    EGFRNORACEVR >60 03/08/2024    GLUCOSE 141 (H) 03/08/2024    CALCIUM 9.8 03/08/2024    ALKPHOS 90 03/08/2024    LABPROT 7.9 03/08/2024    ALBUMIN 4.0 03/08/2024    BILIDIR 0.3 03/04/2022    IBILI 0.50 03/04/2022    AST 19 03/08/2024    ALT 24 03/08/2024        No results found for: "HGBA1C", "MICROALBCREA"     Hematology:  Lab Results   Component Value Date    WBC 7.42 03/08/2024    HGB 13.9 (L) 03/08/2024    HCT 44.0 03/08/2024     03/08/2024       Lipid Panel:  No results found for: "CHOL", "HDL", "LDL", "TRIG", "TOTALCHOLEST"     Urine:  Lab Results   Component Value Date    COLORUA Yellow 03/08/2024    APPEARANCEUA Clear 03/08/2024    SGUA 1.020 03/08/2024    PHUA 6.0 03/08/2024    PROTEINUA 30 (A) 03/08/2024    GLUCOSEUA Negative 03/08/2024    KETONESUA Negative 03/08/2024    BLOODUA Negative 03/08/2024    NITRITESUA Negative 03/08/2024    LEUKOCYTESUR Negative 03/08/2024    RBCUA None Seen 03/08/2024    WBCUA 0-5 03/08/2024    BACTERIA Occasional 03/08/2024    SQEPUA None Seen " 11/17/2023    HYALINECASTS None Seen 07/24/2023        Assessment:          ICD-10-CM ICD-9-CM   1. Other specified abdominal hernia without obstruction or gangrene  K45.8 553.8   2. Hypertension, unspecified type  I10 401.9   3. Constipation, unspecified constipation type  K59.00 564.00        Plan:     1. Other specified abdominal hernia without obstruction or gangrene  -     Ambulatory referral/consult to General Surgery; Future; Expected date: 04/16/2024    2. Hypertension, unspecified type  Overview:  Low Sodium Diet (Dash Diet - less than 2 grams of sodium per day).  Monitor Blood Pressure daily and log. Report any consistent numbers greater than 140/90.  Smoking Cessation encouraged to aid in BP reduction.  Maintain healthy weight with goal BMI <30. Exercise 30 minutes per day 5 days per week      Assessment & Plan:  Increase amlodipine to 10 mg and hctz 25 mg. Continue current medication. Limit salt in diet. Monitor BP and notify clinic for consistently elevated BP >140/90.      Orders:  -     amLODIPine (NORVASC) 10 MG tablet; Take 1 tablet (10 mg total) by mouth once daily.  Dispense: 90 tablet; Refill: 3    3. Constipation, unspecified constipation type  Assessment & Plan:  Take miralax and stool softener daily.  Educated on high fiber diet and exercise.  Drink at least 64 ozs of water daily.  Eat hot breakfast in morning to help have BM.    Referral to GI.    Orders:  -     Ambulatory referral/consult to Gastroenterology; Future; Expected date: 04/16/2024         Future Appointments   Date Time Provider Department Center   4/30/2024 10:00 AM SURGERY CLINIC, [facility] GENERAL SURGERY [facility] KELSIE Sun    5/14/2024  9:45 AM Anum Ward NP [facility]        Anum Ward NP

## 2024-04-26 NOTE — ASSESSMENT & PLAN NOTE
Take miralax and stool softener daily.  Educated on high fiber diet and exercise.  Drink at least 64 ozs of water daily.  Eat hot breakfast in morning to help have BM.    Referral to GI.

## 2024-04-26 NOTE — ASSESSMENT & PLAN NOTE
Increase amlodipine to 10 mg and hctz 25 mg. Continue current medication. Limit salt in diet. Monitor BP and notify clinic for consistently elevated BP >140/90.

## 2024-05-07 ENCOUNTER — OUTPATIENT CASE MANAGEMENT (OUTPATIENT)
Dept: FAMILY MEDICINE | Facility: CLINIC | Age: 34
End: 2024-05-07
Payer: MEDICAID

## 2024-05-07 ENCOUNTER — PATIENT OUTREACH (OUTPATIENT)
Dept: ADMINISTRATIVE | Facility: HOSPITAL | Age: 34
End: 2024-05-07
Payer: MEDICAID

## 2024-05-07 DIAGNOSIS — Z53.1 BLOOD TRANSFUSION DECLINED BECAUSE PATIENT IS JEHOVAH'S WITNESS: ICD-10-CM

## 2024-05-07 DIAGNOSIS — K45.8 OTHER SPECIFIED ABDOMINAL HERNIA WITHOUT OBSTRUCTION OR GANGRENE: ICD-10-CM

## 2024-05-07 DIAGNOSIS — E66.01 MORBID OBESITY: Primary | ICD-10-CM

## 2024-05-07 DIAGNOSIS — E66.01 MORBID OBESITY WITH BMI OF 50.0-59.9, ADULT: ICD-10-CM

## 2024-05-07 DIAGNOSIS — E66.01 MORBID (SEVERE) OBESITY DUE TO EXCESS CALORIES: ICD-10-CM

## 2024-05-07 NOTE — Clinical Note
Please outreached this patient with SDOH - Food Insecurity (high risk); also with present stress concern related to plan surgery that would cause financial strain. Thank you.

## 2024-05-07 NOTE — PROGRESS NOTES
5/7/2024 - Patient is eligible for Outpatient Case Management with 73% Admission and Risk Score; SDOH with risk for food insecurity and stress concern. Outreached completed and discussed the benefit of the program. Patient verbalized understanding and requested to notify his PCP for an order to be referred to this program. Patient also mentioned that he will be seeing a surgeon on May 14, 2024; concern of the outcome and the financial strain. Instructed to keep scheduled appointment with his health care providers. Message was sent to PCP and referral order submitted.    Health Maintenance Topic(s) Outreach Outcomes & Actions Taken:    Primary Care Appt - Outreach Outcomes & Actions Taken  : Appointment with PCP is on 5/16/2024           Additional Notes:    Other upcoming appointment - May 14, 2024 @ Surgery Clinic       Care Management, Digital Medicine, and/or Education Referrals      Next Steps - Referral Actions: Referral Order for Outpatient Case Management placed; Message sent to [facility] Handoff           Care Management, Digital Medicine, and/or Education Referrals      Next Steps - Referral Actions: Message routed to [facility] Handoff to outreach patient for SDOH food insecurity

## 2024-05-07 NOTE — Clinical Note
Patient is eligible for Outpatient Case Management. Patient outreach done and he requested to inform his PCP to be enrolled to this program because of a planned surgery. I sent a an order for Referral to Outpatient . Patient has 73% Admission and ED Risk Score, high risk for food insecurity and stress (SDOH). He also stated of an upcoming appointment in Surgery Clinic on 5/1/4/2024 and with PCP on 5/16/2024. Instructed to keep scheduled appointment and verbalized understanding.

## 2024-05-09 ENCOUNTER — HOSPITAL ENCOUNTER (EMERGENCY)
Facility: HOSPITAL | Age: 34
Discharge: HOME | End: 2024-05-09
Attending: EMERGENCY MEDICINE
Payer: MEDICAID

## 2024-05-09 VITALS
WEIGHT: 315 LBS | RESPIRATION RATE: 18 BRPM | DIASTOLIC BLOOD PRESSURE: 86 MMHG | SYSTOLIC BLOOD PRESSURE: 137 MMHG | TEMPERATURE: 99 F | OXYGEN SATURATION: 98 % | BODY MASS INDEX: 50.12 KG/M2 | HEART RATE: 97 BPM

## 2024-05-09 DIAGNOSIS — S39.012A BACK STRAIN, INITIAL ENCOUNTER: Primary | ICD-10-CM

## 2024-05-09 PROCEDURE — 99284 EMERGENCY DEPT VISIT MOD MDM: CPT | Mod: 25

## 2024-05-09 PROCEDURE — 63600175 PHARM REV CODE 636 W HCPCS: Performed by: NURSE PRACTITIONER

## 2024-05-09 PROCEDURE — 96372 THER/PROPH/DIAG INJ SC/IM: CPT | Performed by: NURSE PRACTITIONER

## 2024-05-09 RX ORDER — KETOROLAC TROMETHAMINE 10 MG/1
10 TABLET, FILM COATED ORAL EVERY 6 HOURS
Qty: 20 TABLET | Refills: 0 | Status: SHIPPED | OUTPATIENT
Start: 2024-05-09 | End: 2024-05-14

## 2024-05-09 RX ORDER — CYCLOBENZAPRINE HCL 10 MG
10 TABLET ORAL 2 TIMES DAILY PRN
Qty: 20 TABLET | Refills: 0 | Status: SHIPPED | OUTPATIENT
Start: 2024-05-09 | End: 2024-05-19

## 2024-05-09 RX ORDER — KETOROLAC TROMETHAMINE 30 MG/ML
60 INJECTION, SOLUTION INTRAMUSCULAR; INTRAVENOUS
Status: COMPLETED | OUTPATIENT
Start: 2024-05-09 | End: 2024-05-09

## 2024-05-09 RX ADMIN — KETOROLAC TROMETHAMINE 60 MG: 30 INJECTION, SOLUTION INTRAMUSCULAR at 05:05

## 2024-05-09 NOTE — FIRST PROVIDER EVALUATION
Medical screening examination initiated.  I have conducted a focused provider triage encounter, findings are as follows:    Brief history of present illness:  Patient states lower back pain radiating down bilateral lower extremities. Denies any injury.     Vitals:    05/09/24 1700   BP: 137/86   Pulse: 97   Resp: 18   Temp: 98.6 °F (37 °C)   SpO2: 98%   Weight: (!) 145.2 kg (320 lb)       Pertinent physical exam:  Awake, alert, ambulatory      Brief workup plan:  Imaging    Preliminary workup initiated; this workup will be continued and followed by the physician or advanced practice provider that is assigned to the patient when roomed.

## 2024-05-09 NOTE — DISCHARGE INSTRUCTIONS
Take ketorolac as prescribed.  Okay to rotate this medication with over-the-counter Tylenol.      Additionally, you may take cyclobenzaprine as needed for muscle spasms--recommend taking at night as this medication may make you drowsy.  You may make take during the day if you have worsening pain, however, it is not safe to drive or work on.    Also recommend applying heat, ice to the area of pain.  Recommend rest over the next few days until symptoms improve.    Recommend follow up with primary care for continued management of this problem.  Return to ER for worsening symptoms.

## 2024-05-09 NOTE — Clinical Note
"Ryan"Jessie Mcbride was seen and treated in our emergency department on 5/9/2024.  He may return to work on 05/10/2024.       If you have any questions or concerns, please don't hesitate to call.       LPN    "

## 2024-05-09 NOTE — ED PROVIDER NOTES
"Encounter Date: 5/9/2024       History     Chief Complaint   Patient presents with    Back Pain     C/o lower back pain radiating down bilateral legs x 2 weeks. Denies any recent trauma/injury to back. Denies loss of bladder/bowel. States took OTC meds today with no relief. Hx HTN.      See MDM for details     The history is provided by the patient.     Review of patient's allergies indicates:   Allergen Reactions    Grass pollen-june grass standard Other (See Comments)     Pollen. States becomes "very irritable"      Past Medical History:   Diagnosis Date    Allergy 4/25/2010    COVID-19 03/04/2024    Depression     Hypertension     Obesity, unspecified      History reviewed. No pertinent surgical history.  Family History   Problem Relation Name Age of Onset    Diabetes Sister Robert Cardona      Social History     Tobacco Use    Smoking status: Never    Smokeless tobacco: Never   Substance Use Topics    Alcohol use: Not Currently    Drug use: Never     Review of Systems   Constitutional:  Negative for chills and fever.   Respiratory:  Negative for shortness of breath.    Musculoskeletal:  Positive for back pain and myalgias. Negative for gait problem.   Neurological:  Negative for weakness and numbness.   All other systems reviewed and are negative.      Physical Exam     Initial Vitals [05/09/24 1700]   BP Pulse Resp Temp SpO2   137/86 97 18 98.6 °F (37 °C) 98 %      MAP       --         Physical Exam    Nursing note and vitals reviewed.  Constitutional: He appears well-developed. No distress.   Obese   HENT:   Head: Normocephalic and atraumatic.   Eyes: Conjunctivae and EOM are normal.   Neck:   Normal range of motion.  Cardiovascular:  Normal rate.           Pulmonary/Chest: No respiratory distress.   Musculoskeletal:         General: Normal range of motion.      Cervical back: Normal range of motion.      Comments: Lumbar spine:  No bony tenderness or step-off deformity.  Positive paraspinal muscle tenderness " bilaterally.  Large palpable muscle spasms present bilaterally.  Negative straight leg raise bilaterally.  5/5 motor strength in bilateral lower extremities.  Ambulatory with pain.     Neurological: He is alert and oriented to person, place, and time. He has normal strength. No sensory deficit. GCS score is 15. GCS eye subscore is 4. GCS verbal subscore is 5. GCS motor subscore is 6.   Motor and sensation intact and equal in bilateral lower extremities   Skin: Skin is warm and dry.   Psychiatric: He has a normal mood and affect. Thought content normal.         ED Course   Procedures  Labs Reviewed - No data to display       Imaging Results              X-Ray Lumbar Spine 2 Or 3 Views (Final result)  Result time 05/09/24 17:51:40      Final result by Myke Bear MD (05/09/24 17:51:40)                   Narrative:    EXAMINATION  XR LUMBAR SPINE 2 OR 3 VIEWS    CLINICAL HISTORY  Back Pain;    TECHNIQUE  A total of 3 images submitted of the lumbosacral spine.    COMPARISON  None available at the time of initial interpretation.    FINDINGS  Vertebral segments: No cortical displacement, acute compression deformity, or focal lesion.  No evidence of traumatic subluxation.    Curvature: Normal lordosis is maintained.    Disc spaces: Intervertebral spacing is preserved.    Other findings: The partially visualized pelvic joint spaces are congruent and no focal irregularity of the bony pelvis is identified.  No acute or focal soft tissue abnormality.    IMPRESSION  No convincing acute radiographic abnormality.      Electronically signed by: Myke Bear  Date:    05/09/2024  Time:    17:51                                     Medications   ketorolac injection 60 mg (60 mg Intramuscular Given 5/9/24 1750)     Medical Decision Making  34-year-old male presents to the ER for evaluation of lower back pain x2 weeks.  He denies any recent injury or trauma.  Patient localizes pain to the bilateral lower back.  He reports  radiation to bilateral lower extremities.  He describes the pain as constant and varying in character and intensity.  He denies any numbness, tingling, weakness.  He reports taking Aleve and ibuprofen without relief.  He reports worsening pain with movement. He denies any relieving factors at this time.  He denies any previous lower back pain, injuries, or surgeries.    XR negative for any acute process. Palpable muscle spasms on exam. Suspect that symptoms are secondary to inflammation and spasms. IM Toradol given in ED. Will discharge home with Toradol and Flexeril for symptomatic relief. Discussed return to ER precautions.     Amount and/or Complexity of Data Reviewed  Radiology:  Decision-making details documented in ED Course.    Risk  Prescription drug management.      Additional MDM:   Differential Diagnosis:   Other: The following diagnoses were also considered and will be evaluated: fracture, HNP and radiculopathy.            ED Course as of 05/09/24 1913   Thu May 09, 2024   1757 X-ray lumbar spine: IMPRESSION  No convincing acute radiographic abnormality.   [LM]      ED Course User Index  [LM] Clementina Valerio PA                           Clinical Impression:  Final diagnoses:  [S39.012A] Back strain, initial encounter (Primary)          ED Disposition Condition    Discharge Stable          ED Prescriptions       Medication Sig Dispense Start Date End Date Auth. Provider    ketorolac (TORADOL) 10 mg tablet Take 1 tablet (10 mg total) by mouth every 6 (six) hours. for 5 days 20 tablet 5/9/2024 5/14/2024 Clementina Valerio PA    cyclobenzaprine (FLEXERIL) 10 MG tablet Take 1 tablet (10 mg total) by mouth 2 (two) times daily as needed for Muscle spasms. 20 tablet 5/9/2024 5/19/2024 Clementina Valerio PA          Follow-up Information       Follow up With Specialties Details Why Contact Info    Anum Ward NP Family Medicine Schedule an appointment as soon as possible for a visit in 2 weeks As needed, If  symptoms continue [address] [facility] [address]  [phone]               Clementina Valerio PA  05/09/24 8413

## 2024-05-13 ENCOUNTER — OUTPATIENT CASE MANAGEMENT (OUTPATIENT)
Dept: ADMINISTRATIVE | Facility: OTHER | Age: 34
End: 2024-05-13
Payer: MEDICAID

## 2024-05-13 NOTE — PROGRESS NOTES
Outpatient Care Management  Initial Patient Assessment    Patient: Ryan Mcbride  MRN: [id number]  Date of Service: 05/13/2024  Completed by: Mirna Shah RN  Referral Date: 05/07/2024  Date of Eligibility: 5/8/2024  Program:   High Risk  Status: Ongoing  Effective Dates: 5/13/2024 - present  Responsible Staff: Mirna Shah RN        Reason for Visit   Patient presents with    OPCM Enrollment Call    Nursing Assessment    Other       Brief Summary:  Ryan Mcbride was referred by Provider for Morbid Obesity, ABD Hernia, Blood Transfusion Declined due to Oriental orthodox. . Patient qualifies for program based on Risk Score = 73%.  PHQ = 2.   Active problem list, medical, surgical and social history reviewed. Active comorbidities include Obesity, Hypertension, Depression, Constipation, Ventral Hernia, ED, COVID-19.. Areas of need identified by patient include education on diet & exercise.     Next steps:   Ryan agrees to follow up call within 1 week on or around 5/22 and:  Participating in OPCM & call CM PRN.  Speak to CHW for SDOH.   Attend upcoming F/U appts.   Take medication as prescribed.       Disability Status  Hearing Difficulty or Deaf: no  Visual Difficulty or Blind: yes  Visual and Hearing Needs Conclusion: Wears regular glasses at all times.  Difficulty Concentrating, Remembering or Making Decisions: yes  Concentration Management: Use calendar & notes as reminders  Communication Difficulty: no  Eating/Swallowing Difficulty: no  Walking or Climbing Stairs Difficulty: no  Dressing/Bathing Difficulty: no  Toileting : Independent  Continence : Incontinence - Bowel  Difficulty Managing Errands Independently: no  Errands Management: Pt runs own errands.  Equipment Currently Used at Home: none  ADL Conclusion Statement: Independent with ADLs  Change in Functional Status Since Onset of Current Illness/Injury: yes        Spiritual Beliefs  Spiritual, Cultural Beliefs, Hoahaoism Practices, Values  that Affect Care: yes  Description of Beliefs that Will Affect Care: No Blood or Blood Products (Jehovas Witness)      Social History     Socioeconomic History    Marital status:    Tobacco Use    Smoking status: Never    Smokeless tobacco: Never   Substance and Sexual Activity    Alcohol use: Not Currently    Drug use: Never    Sexual activity: Yes     Partners: Female     Birth control/protection: None     Social Determinants of Health     Financial Resource Strain: Medium Risk (3/26/2024)    Overall Financial Resource Strain (CARDIA)     Difficulty of Paying Living Expenses: Somewhat hard   Food Insecurity: Food Insecurity Present (3/26/2024)    Hunger Vital Sign     Worried About Running Out of Food in the Last Year: Often true     Ran Out of Food in the Last Year: Often true   Transportation Needs: No Transportation Needs (3/26/2024)    PRAPARE - Transportation     Lack of Transportation (Medical): No     Lack of Transportation (Non-Medical): No   Physical Activity: Unknown (3/26/2024)    Exercise Vital Sign     Days of Exercise per Week: 1 day   Stress: Stress Concern Present (3/26/2024)    [place] [employer] - Occupational Stress Questionnaire     Feeling of Stress : To some extent   Housing Stability: Low Risk  (3/26/2024)    Housing Stability Vital Sign     Unable to Pay for Housing in the Last Year: No     Number of Places Lived in the Last Year: 2     Unstable Housing in the Last Year: No       Roles and Relationships  Primary Source of Support/Comfort: spouse  Name of Support/Comfort Primary Source: Not Given  Primary Roles/Responsibilities: wage earner, full-time      Advance Directives (For Healthcare)  Advance Directive  (If Adv Dir status is received, view document under Adv Dir in header or Chart Review Media tab): Patient does not have Advance Directive, declines information.        Patient Reported Insurance  Verified current insurance plan:: Other (see comment)  (States no longer on BCBS but couldn't tell me name of health insurance)            5/13/2024     1:41 PM 3/12/2024    12:15 PM 12/5/2022     2:08 PM 6/16/2022    12:28 PM   Depression Patient Health Questionnaire   Over the last two weeks how often have you been bothered by little interest or pleasure in doing things Several days Not at all Not at all Not at all   Over the last two weeks how often have you been bothered by feeling down, depressed or hopeless Several days Not at all Not at all Not at all   PHQ-2 Total Score 2 0 0 0       Learning Assessment       05/13/2024 1454 [facility] (5/13/2024 - Present)   Created by Mirna Shah, RN - RN (Nurse) Status: Complete                 PRIMARY LEARNER     Primary Learner Name:  Andrea Mcbride HT - 05/13/2024 1454    Relationship:  Patient HT - 05/13/2024 1454    Does the primary learner have any barriers to learning?:  No Barriers HT - 05/13/2024 1454    What is the preferred language of the primary learner?:  English HT - 05/13/2024 1454    Is an  required?:  No HT - 05/13/2024 1454    How does the primary learner prefer to learn new concepts?:  Listening, Reading HT - 05/13/2024 1454    How often do you need to have someone help you read instructions, pamphlets, or written material from your doctor or pharmacy?:  Never HT - 05/13/2024 1454        CO-LEARNER #1     No question answered        CO-LEARNER #2     No question answered        SPECIAL TOPICS     No question answered        ANSWERED BY:     -:  Patient HT - 05/13/2024 1454        Comments         Edit History       Mirna Shah, RN - RN (Nurse)   05/13/2024 1454

## 2024-05-13 NOTE — LETTER
May 13, 2024             Dear Ryan    Welcome to [facility] Complex Care Management Program.  It was a pleasure talking with you today.  My name is Mirna Shah, and I look forward to being your Care Manager.  My goal is to help you function at the healthiest and highest level possible.  You can contact me directly at [phone].    As an [facility] patient, some of the services we may be able to provide include:     Development of an individualized care plan with a Registered Nurse   Connection with a   Connection with available resources and services    Coordinate communication among your care team members   Provide coaching and education   Help you understand your doctors treatment plan  Help you obtain information about your insurance coverage.     All services provided by [facility]s Complex Care Managers and other care team members are coordinated with and communicated to your primary care team.      As part of your enrollment, you will be receiving education materials and more information about these services in your [facility] account, by phone or through the mail.  If you do not wish to participate or receive information, please contact our office at [phone].      Sincerely,        Mirna Shah RN  [facility]   Out-patient RN Complex Care Manager  [phone]

## 2024-05-14 ENCOUNTER — OFFICE VISIT (OUTPATIENT)
Dept: SURGERY | Facility: CLINIC | Age: 34
End: 2024-05-14
Payer: MEDICAID

## 2024-05-14 VITALS
HEART RATE: 99 BPM | WEIGHT: 315 LBS | OXYGEN SATURATION: 94 % | TEMPERATURE: 98 F | DIASTOLIC BLOOD PRESSURE: 82 MMHG | SYSTOLIC BLOOD PRESSURE: 123 MMHG | RESPIRATION RATE: 19 BRPM | HEIGHT: 67 IN | BODY MASS INDEX: 49.44 KG/M2

## 2024-05-14 DIAGNOSIS — K45.8 OTHER SPECIFIED ABDOMINAL HERNIA WITHOUT OBSTRUCTION OR GANGRENE: Primary | ICD-10-CM

## 2024-05-14 PROCEDURE — 99215 OFFICE O/P EST HI 40 MIN: CPT | Mod: PBBFAC

## 2024-05-14 NOTE — PROGRESS NOTES
"  General Surgery  Clinic Note    SUBJECTIVE:     Reason for Consultation:   Ventral hernia    History of Present Illness:  34 year old female presents with ventral hernia. He states he noticed it about 5 months ago. He had gone to the ED where he got a CT scan confirming some omental/fat tissue in it. He states he also has been trying to loose weight. He currently has been referred to bariatric surgery but has had some issues with insurance. No prior surgeries    Allergies:  Review of patient's allergies indicates:   Allergen Reactions    Grass pollen-june grass standard Other (See Comments)     Pollen. States becomes "very irritable"        Home Medications:  Current Outpatient Medications on File Prior to Visit   Medication Sig    amLODIPine (NORVASC) 10 MG tablet Take 1 tablet (10 mg total) by mouth once daily.    buPROPion (WELLBUTRIN XL) 150 MG TB24 tablet Take 1 tablet (150 mg total) by mouth once daily.    cyclobenzaprine (FLEXERIL) 10 MG tablet Take 1 tablet (10 mg total) by mouth 2 (two) times daily as needed for Muscle spasms.    hydroCHLOROthiazide (HYDRODIURIL) 25 MG tablet Take 1 tablet (25 mg total) by mouth once daily.    levocetirizine (XYZAL) 5 MG tablet Take 1 tablet (5 mg total) by mouth every evening.    tadalafiL (CIALIS) 10 MG tablet Take 1 tablet (10 mg total) by mouth daily as needed for Erectile Dysfunction.    HYDROcodone-acetaminophen (NORCO) 7.5-325 mg per tablet Take 1 tablet by mouth every 6 (six) hours as needed for Pain. (Patient not taking: Reported on 5/13/2024)    ketorolac (TORADOL) 10 mg tablet Take 1 tablet (10 mg total) by mouth every 6 (six) hours. for 5 days (Patient not taking: Reported on 5/13/2024)    polyethylene glycol (GLYCOLAX) 17 gram PwPk Take 17 g by mouth daily as needed for Constipation. (Patient not taking: Reported on 5/14/2024)    terbinafine HCL (LAMISIL AT) 1 % cream Apply topically 2 (two) times daily. (Patient not taking: Reported on 5/14/2024)     No " current facility-administered medications on file prior to visit.       Past Medical History:   Diagnosis Date    Allergy 4/25/2010    COVID-19 03/04/2024    Depression     Hypertension     Obesity, unspecified      History reviewed. No pertinent surgical history.  Family History   Problem Relation Name Age of Onset    Diabetes Sister Robert Cardona      Social History     Tobacco Use    Smoking status: Never    Smokeless tobacco: Never   Substance Use Topics    Alcohol use: Not Currently    Drug use: Never        Review of Systems:  Constitutional: no fever or chills  Respiratory: no cough or shortness of breath  Cardiovascular: no chest pain or palpitations  Gastrointestinal: no nausea or vomiting, no abdominal pain or change in bowel habits  Genitourinary: no hematuria or dysuria  I  OBJECTIVE:     Vital Signs:  Temp: 97.8 °F (36.6 °C) (05/14/24 1305)  Pulse: 99 (05/14/24 1305)  Resp: 19 (05/14/24 1305)  BP: 123/82 (05/14/24 1305)  SpO2: (!) 94 % (05/14/24 1305)    Physical Exam:  No acute distress  Normal respiratory effort on room air  RRR  Abdomen soft, nontender, nondistended  Ventral hernia reducible, difficult to feel fascial defect due to body habitus  Moving all extremities    CT scan showing 3cm supra-umbilical hernia with omental fat      ASSESSMENT:     34 year old male with obesity BMI 50 presents with 3cm reducible ventral hernia (supra-umbilical)    PLAN:     Discussed with patient high risk of recurrence given BMI 50 with risks and complications including mesh infection, poor wound healing, etc.    Goal weight for ventral hernia is a BMI 35 which for him is 225lbs  Given instructions for weight loss  Referral placed for bariatrics  Return to clinic once goal weight is achieved for discussion of ventral hernia repair    Dayan Tejeda MD  General Surgery Resident PGY5

## 2024-05-14 NOTE — PROGRESS NOTES
Patient seen by Dr. Larose. Patient instructed to RTC as needed. Patient will be referred to Bariatrics.

## 2024-05-15 NOTE — PROGRESS NOTES
I have reviewed the notes, assessments, and/or procedures performed by the resident, I concur with her/his documentation of Ryan Mcbride.     Raquel Marquez MD

## 2024-05-16 ENCOUNTER — OFFICE VISIT (OUTPATIENT)
Dept: FAMILY MEDICINE | Facility: CLINIC | Age: 34
End: 2024-05-16
Payer: MEDICAID

## 2024-05-16 VITALS
OXYGEN SATURATION: 97 % | DIASTOLIC BLOOD PRESSURE: 86 MMHG | WEIGHT: 315 LBS | HEART RATE: 101 BPM | BODY MASS INDEX: 49.44 KG/M2 | HEIGHT: 67 IN | SYSTOLIC BLOOD PRESSURE: 138 MMHG | RESPIRATION RATE: 20 BRPM | TEMPERATURE: 98 F

## 2024-05-16 DIAGNOSIS — M79.672 BILATERAL FOOT PAIN: ICD-10-CM

## 2024-05-16 DIAGNOSIS — M54.50 CHRONIC BILATERAL LOW BACK PAIN WITHOUT SCIATICA: ICD-10-CM

## 2024-05-16 DIAGNOSIS — I10 HYPERTENSION, UNSPECIFIED TYPE: Primary | ICD-10-CM

## 2024-05-16 DIAGNOSIS — G89.29 CHRONIC BILATERAL LOW BACK PAIN WITHOUT SCIATICA: ICD-10-CM

## 2024-05-16 DIAGNOSIS — M79.671 BILATERAL FOOT PAIN: ICD-10-CM

## 2024-05-16 PROCEDURE — 3075F SYST BP GE 130 - 139MM HG: CPT | Mod: CPTII,,,

## 2024-05-16 PROCEDURE — 99214 OFFICE O/P EST MOD 30 MIN: CPT | Mod: PBBFAC,PN

## 2024-05-16 PROCEDURE — 1159F MED LIST DOCD IN RCRD: CPT | Mod: CPTII,,,

## 2024-05-16 PROCEDURE — 99214 OFFICE O/P EST MOD 30 MIN: CPT | Mod: S$PBB,,,

## 2024-05-16 PROCEDURE — 1160F RVW MEDS BY RX/DR IN RCRD: CPT | Mod: CPTII,,,

## 2024-05-16 PROCEDURE — 96372 THER/PROPH/DIAG INJ SC/IM: CPT | Mod: PBBFAC,PN

## 2024-05-16 PROCEDURE — 3079F DIAST BP 80-89 MM HG: CPT | Mod: CPTII,,,

## 2024-05-16 PROCEDURE — 4010F ACE/ARB THERAPY RXD/TAKEN: CPT | Mod: CPTII,,,

## 2024-05-16 PROCEDURE — 3008F BODY MASS INDEX DOCD: CPT | Mod: CPTII,,,

## 2024-05-16 RX ORDER — KETOROLAC TROMETHAMINE 30 MG/ML
30 INJECTION, SOLUTION INTRAMUSCULAR; INTRAVENOUS ONCE
Status: COMPLETED | OUTPATIENT
Start: 2024-05-16 | End: 2024-05-16

## 2024-05-16 RX ORDER — LOSARTAN POTASSIUM 50 MG/1
50 TABLET ORAL DAILY
Qty: 90 TABLET | Refills: 0 | Status: SHIPPED | OUTPATIENT
Start: 2024-05-16 | End: 2024-08-14

## 2024-05-16 RX ORDER — DICLOFENAC SODIUM 75 MG/1
75 TABLET, DELAYED RELEASE ORAL 2 TIMES DAILY PRN
Qty: 60 TABLET | Refills: 2 | Status: SHIPPED | OUTPATIENT
Start: 2024-05-16 | End: 2024-08-14

## 2024-05-16 RX ADMIN — KETOROLAC TROMETHAMINE 30 MG: 30 INJECTION, SOLUTION INTRAMUSCULAR at 10:05

## 2024-05-16 NOTE — ASSESSMENT & PLAN NOTE
Lower back stretches daily.  Avoid strenuous lifting, use proper body mechanics.  Heating pad, Ice pack, Biofreeze or Epsom salt baths as needed.  Exercise to strengthen core muscles to support your back.

## 2024-05-16 NOTE — PROGRESS NOTES
Patient Name: Ryan Mcbride     : 1990    MRN: [id number]     Subjective:     Patient ID: Ryan Mcbride is a 34 y.o. male.    Chief Complaint:   Chief Complaint   Patient presents with    Follow-up     F/u appointment. Requesting to d/c Amlodipine d/t swelling. C/o left foot swelling.         HPI: 2024: presents to clinic today, states he is still not able to have surgery for hernia. Is requesting to stop his amlodipine secondary to leg swelling. Feared condition not present on exam today. Requesting Toradol injections, states he is having flare up of his chronic back pain. Denies red flag symptoms. Patient denies chest pain, palpitations, and shortness of breath.  Patient denies fever, night sweats, chills, nausea, vomiting, diarrhea, constipation, weight loss, and changes in appetite.        ROS:       12 point review of systems conducted, negative except as stated in the history of present illness. See HPI for details.    History:     Past Medical History:   Diagnosis Date    Allergy 2010    COVID-19 2024    Depression     Hypertension     Obesity, unspecified         History reviewed. No pertinent surgical history.    Family History   Problem Relation Name Age of Onset    Diabetes Sister Robert Cardona         Social History     Tobacco Use    Smoking status: Never    Smokeless tobacco: Never   Substance and Sexual Activity    Alcohol use: Not Currently    Drug use: Never    Sexual activity: Yes     Partners: Female     Birth control/protection: None       Current Outpatient Medications   Medication Instructions    buPROPion (WELLBUTRIN XL) 150 mg, Oral, Daily    cyclobenzaprine (FLEXERIL) 10 mg, Oral, 2 times daily PRN    diclofenac (VOLTAREN) 75 mg, Oral, 2 times daily PRN    hydroCHLOROthiazide (HYDRODIURIL) 25 mg, Oral, Daily    HYDROcodone-acetaminophen (NORCO) 7.5-325 mg per tablet 1 tablet, Oral, Every 6 hours PRN    levocetirizine (XYZAL) 5 mg, Oral, Nightly    losartan  "(COZAAR) 50 mg, Oral, Daily    polyethylene glycol (GLYCOLAX) 17 g, Oral, Daily PRN    tadalafiL (CIALIS) 10 mg, Oral, Daily PRN    terbinafine HCL (LAMISIL AT) 1 % cream Topical (Top), 2 times daily        Review of patient's allergies indicates:   Allergen Reactions    Grass pollen-june grass standard Other (See Comments)     Pollen. States becomes "very irritable"        Objective:     Visit Vitals  /86 (BP Location: Right arm, Patient Position: Sitting)   Pulse 101   Temp 98.3 °F (36.8 °C) (Oral)   Resp 20   Ht 5' 7" (1.702 m)   Wt (!) 155.9 kg (343 lb 9.6 oz)   SpO2 97%   BMI 53.82 kg/m²       Physical Examination:     Physical Exam  Constitutional:       General: He is not in acute distress.     Appearance: Normal appearance. He is obese. He is not ill-appearing.   Cardiovascular:      Rate and Rhythm: Normal rate and regular rhythm.      Heart sounds: Normal heart sounds.   Pulmonary:      Effort: Pulmonary effort is normal. No respiratory distress.      Breath sounds: Normal breath sounds.   Musculoskeletal:      Cervical back: Normal range of motion.   Skin:     General: Skin is warm and dry.   Neurological:      Mental Status: He is alert and oriented to person, place, and time.   Psychiatric:         Mood and Affect: Mood normal.         Behavior: Behavior normal.         Lab Results:     Chemistry:  Lab Results   Component Value Date     03/08/2024    K 3.7 03/08/2024    CHLORIDE 104 03/08/2024    BUN 10.0 03/08/2024    CREATININE 1.14 03/08/2024    EGFRNORACEVR >60 03/08/2024    GLUCOSE 141 (H) 03/08/2024    CALCIUM 9.8 03/08/2024    ALKPHOS 90 03/08/2024    LABPROT 7.9 03/08/2024    ALBUMIN 4.0 03/08/2024    BILIDIR 0.3 03/04/2022    IBILI 0.50 03/04/2022    AST 19 03/08/2024    ALT 24 03/08/2024        No results found for: "HGBA1C", "MICROALBCREA"     Hematology:  Lab Results   Component Value Date    WBC 7.42 03/08/2024    HGB 13.9 (L) 03/08/2024    HCT 44.0 03/08/2024     " "03/08/2024       Lipid Panel:  No results found for: "CHOL", "HDL", "LDL", "TRIG", "TOTALCHOLEST"     Urine:  Lab Results   Component Value Date    COLORUA Yellow 03/08/2024    APPEARANCEUA Clear 03/08/2024    SGUA 1.020 03/08/2024    PHUA 6.0 03/08/2024    PROTEINUA 30 (A) 03/08/2024    GLUCOSEUA Negative 03/08/2024    KETONESUA Negative 03/08/2024    BLOODUA Negative 03/08/2024    NITRITESUA Negative 03/08/2024    LEUKOCYTESUR Negative 03/08/2024    RBCUA None Seen 03/08/2024    WBCUA 0-5 03/08/2024    BACTERIA Occasional 03/08/2024    SQEPUA None Seen 11/17/2023    HYALINECASTS None Seen 07/24/2023        Assessment:          ICD-10-CM ICD-9-CM   1. Hypertension, unspecified type  I10 401.9   2. Bilateral foot pain  M79.671 729.5    M79.672    3. Chronic bilateral low back pain without sciatica  M54.50 724.2    G89.29 338.29        Plan:     1. Hypertension, unspecified type  Overview:  Low Sodium Diet (Dash Diet - less than 2 grams of sodium per day).  Monitor Blood Pressure daily and log. Report any consistent numbers greater than 140/90.  Smoking Cessation encouraged to aid in BP reduction.  Maintain healthy weight with goal BMI <30. Exercise 30 minutes per day 5 days per week      Assessment & Plan:  Continue hctz 25 mg.  Stopping amlodipine, start losartan 50 mg daily. Limit salt in diet. Monitor BP and notify clinic for consistently elevated BP >140/90.      Orders:  -     losartan (COZAAR) 50 MG tablet; Take 1 tablet (50 mg total) by mouth once daily.  Dispense: 90 tablet; Refill: 0    2. Bilateral foot pain  -     diclofenac (VOLTAREN) 75 MG EC tablet; Take 1 tablet (75 mg total) by mouth 2 (two) times daily as needed (pain).  Dispense: 60 tablet; Refill: 2    3. Chronic bilateral low back pain without sciatica  Assessment & Plan:  Lower back stretches daily.  Avoid strenuous lifting, use proper body mechanics.  Heating pad, Ice pack, Biofreeze or Epsom salt baths as needed.  Exercise to strengthen core " muscles to support your back.          Orders:  -     ketorolac injection 30 mg  -     diclofenac (VOLTAREN) 75 MG EC tablet; Take 1 tablet (75 mg total) by mouth 2 (two) times daily as needed (pain).  Dispense: 60 tablet; Refill: 2         Follow up in about 8 months (around 1/16/2025), or if symptoms worsen or fail to improve.    Future Appointments   Date Time Provider Department Center   1/14/2025 12:00 PM Anum Ward NP [facility]        Anum Ward NP

## 2024-05-16 NOTE — ASSESSMENT & PLAN NOTE
Continue hctz 25 mg.  Stopping amlodipine, start losartan 50 mg daily. Limit salt in diet. Monitor BP and notify clinic for consistently elevated BP >140/90.

## 2024-05-20 ENCOUNTER — PATIENT OUTREACH (OUTPATIENT)
Dept: ADMINISTRATIVE | Facility: OTHER | Age: 34
End: 2024-05-20
Payer: MEDICAID

## 2024-05-20 NOTE — PROGRESS NOTES
Community Health Worker attempted to contact patient via telephone to assist with . Left a voicemail message on patient's telephone number [phone].  Will attempt again at a later date.

## 2024-05-23 ENCOUNTER — PATIENT OUTREACH (OUTPATIENT)
Dept: ADMINISTRATIVE | Facility: OTHER | Age: 34
End: 2024-05-23
Payer: MEDICAID

## 2024-05-23 NOTE — PROGRESS NOTES
Community Health Worker's second attempt to contact this patient regarding SDOH.  Unable to contact patient at this time.  Left a voicemail message and will try again at a later date.

## 2024-05-28 ENCOUNTER — OUTPATIENT CASE MANAGEMENT (OUTPATIENT)
Dept: ADMINISTRATIVE | Facility: OTHER | Age: 34
End: 2024-05-28
Payer: MEDICAID

## 2024-05-28 NOTE — LETTER
Ryan Mcbride  [address] [facility] [address]      Dear Ryan Mcbride,     I am your nurse with [facility]s Out[facility] Department. I was unsuccessful in reaching you today. At your earliest convenience, I would like to discuss your healthcare progress.      Please contact me at [phone] from 8:00AM to 4:30 PM on Monday thru Friday.     As you know, [facility] is a program offered to you through [facility] where a nurse is available 24/7 to answer questions or provide medical advice, their number is [phone].    Thanks,      Mirna Shah RN  Outpatient Care Management

## 2024-05-28 NOTE — PROGRESS NOTES
Outreach attempt for [facility] F/U. No answer. VM left with contact info. Will attempt outreach again, on or around 5/31. Missed attempt letter mailed.

## 2024-05-31 ENCOUNTER — PATIENT OUTREACH (OUTPATIENT)
Dept: ADMINISTRATIVE | Facility: OTHER | Age: 34
End: 2024-05-31
Payer: MEDICAID

## 2024-06-01 ENCOUNTER — HOSPITAL ENCOUNTER (EMERGENCY)
Facility: HOSPITAL | Age: 34
Discharge: HOME | End: 2024-06-01
Attending: EMERGENCY MEDICINE
Payer: MEDICAID

## 2024-06-01 VITALS
HEIGHT: 67 IN | SYSTOLIC BLOOD PRESSURE: 136 MMHG | DIASTOLIC BLOOD PRESSURE: 88 MMHG | WEIGHT: 315 LBS | OXYGEN SATURATION: 97 % | BODY MASS INDEX: 49.44 KG/M2 | RESPIRATION RATE: 20 BRPM | TEMPERATURE: 98 F | HEART RATE: 109 BPM

## 2024-06-01 DIAGNOSIS — L03.319 CELLULITIS OF FLANK: Primary | ICD-10-CM

## 2024-06-01 PROCEDURE — 99283 EMERGENCY DEPT VISIT LOW MDM: CPT

## 2024-06-01 RX ORDER — SULFAMETHOXAZOLE AND TRIMETHOPRIM 800; 160 MG/1; MG/1
1 TABLET ORAL 2 TIMES DAILY
Qty: 20 TABLET | Refills: 0 | Status: SHIPPED | OUTPATIENT
Start: 2024-06-01 | End: 2024-06-11

## 2024-06-02 NOTE — ED PROVIDER NOTES
"Encounter Date: 6/1/2024       History     Chief Complaint   Patient presents with    Abscess     Noticed boil 2-3 days ago to lower back on the left side. States unable to sleep bc of the pain. Slight swelling noted into fold of skin.      See MDM    The history is provided by the patient. No  was used.     Review of patient's allergies indicates:   Allergen Reactions    Grass pollen-june grass standard Other (See Comments)     Pollen. States becomes "very irritable"      Past Medical History:   Diagnosis Date    Allergy 4/25/2010    COVID-19 03/04/2024    Depression     Hypertension     Obesity, unspecified      No past surgical history on file.  Family History   Problem Relation Name Age of Onset    Diabetes Sister Robert Cardona      Social History     Tobacco Use    Smoking status: Never    Smokeless tobacco: Never   Substance Use Topics    Alcohol use: Not Currently    Drug use: Never     Review of Systems   Constitutional:  Negative for fever.   Respiratory:  Negative for cough and shortness of breath.    Cardiovascular:  Negative for chest pain.   Gastrointestinal:  Negative for abdominal pain.   Genitourinary:  Negative for difficulty urinating and dysuria.   Musculoskeletal:  Negative for gait problem.   Skin:  Negative for color change.   Neurological:  Negative for dizziness, speech difficulty and headaches.   Psychiatric/Behavioral:  Negative for hallucinations and suicidal ideas.    All other systems reviewed and are negative.      Physical Exam     Initial Vitals [06/01/24 2111]   BP Pulse Resp Temp SpO2   124/69 (!) 114 18 98.2 °F (36.8 °C) 97 %      MAP       --         Physical Exam    Nursing note and vitals reviewed.  Constitutional: He appears well-developed and well-nourished.   HENT:   Head: Normocephalic.   Eyes: EOM are normal.   Neck: Neck supple.   Normal range of motion.  Cardiovascular:  Normal rate, regular rhythm, normal heart sounds and intact distal pulses.       "     Pulmonary/Chest: Breath sounds normal.   Abdominal: Abdomen is soft. Bowel sounds are normal.   Musculoskeletal:         General: Normal range of motion.      Cervical back: Normal range of motion and neck supple.     Neurological: He is alert and oriented to person, place, and time. He has normal strength.   Skin: Skin is warm and dry. Capillary refill takes less than 2 seconds.   Cellulitis to left flank    Psychiatric: He has a normal mood and affect. His behavior is normal. Judgment and thought content normal.         ED Course   Procedures  Labs Reviewed - No data to display       Imaging Results    None          Medications - No data to display  Medical Decision Making  Historian:  Patient.  Patient is a 34-year-old male  that presents with redness and swelling to left flank that has been present a few days. Associated symptoms nothing. Surrounding information is nothing. Exacerbated by nothing. Relieved by nothing. Patient treatment prior to arrival none. Risk factors include none. Other history pertaining to this complaint nothing.   Assessment:  See physical exam.  DD:  Abscess, cellulitis, cyst  ED Course: History was obtained.  Physical was performed.  Patient does not appear to have a drainable abscess at this time.  This appears to be cellulitis.  I will put him on Bactrim.. Medical or surgical consults:  None. Social determinants that affect healthcare:  None.       Risk  Prescription drug management.  Risk Details: Bactrim                                      Clinical Impression:  Final diagnoses:  [L03.319] Cellulitis of flank (Primary)          ED Disposition Condition    Discharge Stable          ED Prescriptions       Medication Sig Dispense Start Date End Date Auth. Provider    sulfamethoxazole-trimethoprim 800-160mg (BACTRIM DS) 800-160 mg Tab Take 1 tablet by mouth 2 (two) times daily. for 10 days 20 tablet 6/1/2024 6/11/2024 Rick Gonsalez FNP          Follow-up Information        Follow up With Specialties Details Why Contact Info    Your Primary Care Provider  Call in 3 days ed follow up              Rick Gonsalez, PAOLOP  06/01/24 2539

## 2024-06-03 ENCOUNTER — HOSPITAL ENCOUNTER (EMERGENCY)
Facility: HOSPITAL | Age: 34
Discharge: HOME | End: 2024-06-04
Attending: EMERGENCY MEDICINE
Payer: MEDICAID

## 2024-06-03 VITALS
HEART RATE: 104 BPM | OXYGEN SATURATION: 97 % | WEIGHT: 315 LBS | BODY MASS INDEX: 49.44 KG/M2 | DIASTOLIC BLOOD PRESSURE: 75 MMHG | SYSTOLIC BLOOD PRESSURE: 119 MMHG | RESPIRATION RATE: 18 BRPM | HEIGHT: 67 IN | TEMPERATURE: 98 F

## 2024-06-03 DIAGNOSIS — L02.212 ABSCESS OF BACK: Primary | ICD-10-CM

## 2024-06-03 PROCEDURE — 10060 I&D ABSCESS SIMPLE/SINGLE: CPT

## 2024-06-03 PROCEDURE — 25000003 PHARM REV CODE 250: Performed by: PHYSICIAN ASSISTANT

## 2024-06-03 PROCEDURE — 99283 EMERGENCY DEPT VISIT LOW MDM: CPT | Mod: 25

## 2024-06-03 RX ORDER — ACETAMINOPHEN 500 MG
1000 TABLET ORAL
Status: COMPLETED | OUTPATIENT
Start: 2024-06-03 | End: 2024-06-03

## 2024-06-03 RX ADMIN — ACETAMINOPHEN 1000 MG: 500 TABLET ORAL at 11:06

## 2024-06-04 NOTE — ED PROVIDER NOTES
"Encounter Date: 6/3/2024       History     Chief Complaint   Patient presents with    Abscess     Pt has abscess on Left shoulder, seen yesterday and prescribed abx, states he took the abx but its not better and wants to know what the course of action is with the abscess.      34 y.o. male presents to the ED with worsening abscess to left flank that began several days ago. Patient states he was seen here on 6/1 and was put on abx however was unable to pick them up until today. Denies fever, chills, n/v    The history is provided by the patient. No  was used.     Review of patient's allergies indicates:   Allergen Reactions    Grass pollen-june grass standard Other (See Comments)     Pollen. States becomes "very irritable"      Past Medical History:   Diagnosis Date    Allergy 4/25/2010    COVID-19 03/04/2024    Depression     Hypertension     Obesity, unspecified      No past surgical history on file.  Family History   Problem Relation Name Age of Onset    Diabetes Sister Robert Cardona      Social History     Tobacco Use    Smoking status: Never    Smokeless tobacco: Never   Substance Use Topics    Alcohol use: Not Currently    Drug use: Never     Review of Systems   Constitutional:  Negative for fever.   HENT:  Negative for sore throat.    Respiratory:  Negative for shortness of breath.    Cardiovascular:  Negative for chest pain.   Gastrointestinal:  Negative for nausea.   Genitourinary:  Negative for dysuria.   Musculoskeletal:  Negative for back pain.   Skin:  Positive for color change and wound. Negative for rash.   Neurological:  Negative for weakness.   Hematological:  Does not bruise/bleed easily.   All other systems reviewed and are negative.      Physical Exam     Initial Vitals [06/03/24 2125]   BP Pulse Resp Temp SpO2   119/75 104 18 98.4 °F (36.9 °C) 97 %      MAP       --         Physical Exam    Nursing note and vitals reviewed.  Constitutional: He appears well-developed and " well-nourished.   HENT:   Head: Normocephalic and atraumatic.   Eyes: EOM are normal. Pupils are equal, round, and reactive to light.   Neck: Neck supple.   Normal range of motion.  Cardiovascular:  Normal rate, regular rhythm and normal heart sounds.           Pulmonary/Chest: Breath sounds normal.   Musculoskeletal:         General: Normal range of motion.      Cervical back: Normal range of motion and neck supple.     Neurological: He is alert and oriented to person, place, and time. He has normal strength. GCS score is 15. GCS eye subscore is 4. GCS verbal subscore is 5. GCS motor subscore is 6.   Skin: Skin is warm and dry.        Psychiatric: He has a normal mood and affect.         ED Course   I & D - Incision and Drainage    Date/Time: 6/3/2024 10:59 PM  Location procedure was performed: [facility] EMERGENCY DEPARTMENT    Performed by: Giorgi Berry PA-C  Authorized by: Giorgi Berry PA-C  Consent Done: Yes  Consent: Verbal consent obtained.  Risks and benefits: risks, benefits and alternatives were discussed  Consent given by: patient  Patient identity confirmed: verbally with patient  Type: abscess  Body area: trunk  Location details: back  Anesthesia: local infiltration    Anesthesia:  Local Anesthetic: lidocaine 1% without epinephrine  Anesthetic total: 5 mL    Patient sedated: no  Scalpel size: 11  Incision type: single straight  Incision depth: dermal  Complexity: simple  Drainage: bloody  Drainage amount: scant  Wound treatment: incision and wound left open  Complications: No  Specimens: No  Implants: No  Comments: Patient did not tolerate the procedure well; we were able to make an incision however not able to express any material    Incision depth: dermal        Labs Reviewed - No data to display       Imaging Results    None          Medications   acetaminophen tablet 1,000 mg (1,000 mg Oral Given 6/3/24 4640)     Medical Decision Making  Differential diagnosis: abscess, cellulitis    34 y.o.  male presents to the ED with worsening abscess to left flank that began several days ago. Patient states he was seen here on 6/1 and was put on abx however was unable to pick them up until today. Denies fever, chills, n/v      Risk  OTC drugs.               ED Course as of 06/04/24 0059 Mon Jun 03, 2024   2340 Encouraged to continue abx that were prescribed to him previous [MJ]      ED Course User Index  [MJ] Giorgi Berry PA-C                           Clinical Impression:  Final diagnoses:  [L02.212] Abscess of back (Primary)          ED Disposition Condition    Discharge Stable          ED Prescriptions    None       Follow-up Information       Follow up With Specialties Details Why Contact Info    Anum Ward NP Family Medicine   [address] [facility] [address]  [phone]      [facility] - Emergency Dept Emergency Medicine In 1 week If symptoms worsen [facility] [facility] [id number] [phone]             Giorgi Berry PA-C  06/04/24 0059

## 2024-06-04 NOTE — ED NOTES
Dc instructions given to pt about rx and follow up with pcp, verbalizes understanding. Pt ambulated to front lobby, gait steady, no distress noted.

## 2024-06-06 ENCOUNTER — HOSPITAL ENCOUNTER (EMERGENCY)
Facility: HOSPITAL | Age: 34
Discharge: HOME | End: 2024-06-06
Attending: EMERGENCY MEDICINE
Payer: MEDICAID

## 2024-06-06 ENCOUNTER — OUTPATIENT CASE MANAGEMENT (OUTPATIENT)
Dept: ADMINISTRATIVE | Facility: OTHER | Age: 34
End: 2024-06-06
Payer: MEDICAID

## 2024-06-06 VITALS
WEIGHT: 315 LBS | HEART RATE: 95 BPM | OXYGEN SATURATION: 98 % | TEMPERATURE: 99 F | SYSTOLIC BLOOD PRESSURE: 167 MMHG | BODY MASS INDEX: 51.06 KG/M2 | DIASTOLIC BLOOD PRESSURE: 96 MMHG | RESPIRATION RATE: 16 BRPM

## 2024-06-06 DIAGNOSIS — Z51.89 WOUND CHECK, ABSCESS: Primary | ICD-10-CM

## 2024-06-06 DIAGNOSIS — L02.91 ABSCESS: ICD-10-CM

## 2024-06-06 PROCEDURE — 99283 EMERGENCY DEPT VISIT LOW MDM: CPT

## 2024-06-06 RX ORDER — IBUPROFEN 600 MG/1
600 TABLET ORAL EVERY 8 HOURS PRN
Qty: 15 TABLET | Refills: 0 | Status: SHIPPED | OUTPATIENT
Start: 2024-06-06

## 2024-06-06 RX ORDER — CLINDAMYCIN HYDROCHLORIDE 150 MG/1
300 CAPSULE ORAL 4 TIMES DAILY
Qty: 80 CAPSULE | Refills: 0 | Status: SHIPPED | OUTPATIENT
Start: 2024-06-06 | End: 2024-06-16

## 2024-06-06 RX ORDER — KETOROLAC TROMETHAMINE 10 MG/1
10 TABLET, FILM COATED ORAL EVERY 6 HOURS
COMMUNITY

## 2024-06-06 NOTE — FIRST PROVIDER EVALUATION
Medical screening examination initiated.  I have conducted a focused provider triage encounter, findings are as follows:    Brief history of present illness:  Patient states that he had an I&D x 2 days ago. States that he is here for a dressing change.    There were no vitals filed for this visit.    Pertinent physical exam:  Awake, alert, ambulatory      Brief workup plan:  Exam    Preliminary workup initiated; this workup will be continued and followed by the physician or advanced practice provider that is assigned to the patient when roomed.

## 2024-06-06 NOTE — ED PROVIDER NOTES
"Encounter Date: 6/6/2024       History     Chief Complaint   Patient presents with    Wound Check     C/o drainage from wound site. States had abscess drained last week. Denies fever. Also reports pain wound area.      Patient states abscess to left flank/black area x several days. States that he was seen here x 2 days ago and the abscess was I&D but no drainage was obtained at that time. States that he is now having drainage and tenderness to the area. Denies any fever or chills. States that he has been on Bactrim for a week. Hx. Of HTN.     The history is provided by the patient.     Review of patient's allergies indicates:   Allergen Reactions    Grass pollen-june grass standard Other (See Comments)     Pollen. States becomes "very irritable"      Past Medical History:   Diagnosis Date    Allergy 4/25/2010    COVID-19 03/04/2024    Depression     Hypertension     Obesity, unspecified      No past surgical history on file.  Family History   Problem Relation Name Age of Onset    Diabetes Sister Robert Cardona      Social History     Tobacco Use    Smoking status: Never    Smokeless tobacco: Never   Substance Use Topics    Alcohol use: Not Currently    Drug use: Never     Review of Systems   Constitutional: Negative.  Negative for fever.   HENT: Negative.     Eyes: Negative.    Respiratory: Negative.     Cardiovascular: Negative.    Gastrointestinal: Negative.    Endocrine: Negative.    Genitourinary: Negative.    Musculoskeletal: Negative.    Skin:         Abscess   Allergic/Immunologic: Negative.    Neurological: Negative.    Hematological: Negative.    Psychiatric/Behavioral: Negative.     All other systems reviewed and are negative.      Physical Exam     Initial Vitals [06/06/24 1237]   BP Pulse Resp Temp SpO2   (!) 145/95 103 20 98.6 °F (37 °C) 98 %      MAP       --         Physical Exam    Nursing note and vitals reviewed.  Constitutional: He appears well-developed and well-nourished. No distress.   HENT: "   Head: Normocephalic and atraumatic.   Mouth/Throat: Oropharynx is clear and moist.   Eyes: Conjunctivae and EOM are normal. Pupils are equal, round, and reactive to light.   Neck: Neck supple.   Normal range of motion.  Cardiovascular:  Normal rate, regular rhythm, normal heart sounds and intact distal pulses.           Pulmonary/Chest: Breath sounds normal. No respiratory distress.   Abdominal: Abdomen is soft. He exhibits no distension. There is no abdominal tenderness.   Musculoskeletal:         General: No edema. Normal range of motion.      Cervical back: Normal range of motion and neck supple.     Neurological: He is alert and oriented to person, place, and time. He has normal strength. Gait normal. GCS score is 15. GCS eye subscore is 4. GCS verbal subscore is 5. GCS motor subscore is 6.   Skin: Skin is warm and dry. Abscess (There is an abscess to left upper flank that has an open incision with purulent drainage. There is induration and mild erythema to the area.) noted. No rash noted.   Psychiatric: He has a normal mood and affect. Thought content normal.         ED Course   Procedures  Labs Reviewed - No data to display       Imaging Results    None          Medications - No data to display  Medical Decision Making  Patient states abscess to left flank/black area x several days. States that he was seen here x 2 days ago and the abscess was I&D but no drainage was obtained at that time. States that he is now having drainage and tenderness to the area. Denies any fever or chills. States that he has been on Bactrim for a week. Hx. Of HTN.       Amount and/or Complexity of Data Reviewed  External Data Reviewed: notes.  Discussion of management or test interpretation with external provider(s): Differential diagnosis (including but not limited to):   Judging by the patient's chief complaint and pertinent history, the patient has the following possible differential diagnoses, including but not limited to the  following.  Some of these are deemed to be lower likelihood and some more likely based on my physical exam and history combined with possible lab work and/or imaging studies.   Please see the pertinent studies, and refer to the HPI.  Some of these diagnoses will take further evaluation to fully rule out, perhaps as an outpatient and the patient was encouraged to follow up when discharged for more comprehensive evaluation.  Abscess, Wound Check, Dressing Change  Abscess is currently open and draining. A large amount of purulent drainage was expressed. Patient tolerated well and dressing change instructions were given. Patient was given ED return precautions.       Risk  Prescription drug management.                                      Clinical Impression:  Final diagnoses:  [Z51.89] Wound check, abscess (Primary)  [L02.91] Abscess          ED Disposition Condition    Discharge Stable          ED Prescriptions       Medication Sig Dispense Start Date End Date Auth. Provider    clindamycin (CLEOCIN) 150 MG capsule Take 2 capsules (300 mg total) by mouth 4 (four) times daily. for 10 days 80 capsule 6/6/2024 6/16/2024 Laine Rader FNP    ibuprofen (ADVIL,MOTRIN) 600 MG tablet Take 1 tablet (600 mg total) by mouth every 8 (eight) hours as needed for Pain. 15 tablet 6/6/2024 -- Laine Rader FNP          Follow-up Information       Follow up With Specialties Details Why Contact Info    Anum Ward NP Family Medicine In 3 days  [address] [place] [id number] [phone]               Laine Rader FNP  06/06/24 5405

## 2024-06-06 NOTE — Clinical Note
"Ryan"Jessie Mcbride was seen and treated in our emergency department on 6/6/2024.  He may return to work on 06/10/2024.       If you have any questions or concerns, please don't hesitate to call.      Laine Rader, PAOLOP"

## 2024-06-10 ENCOUNTER — HOSPITAL ENCOUNTER (EMERGENCY)
Facility: HOSPITAL | Age: 34
Discharge: HOME | End: 2024-06-10
Attending: EMERGENCY MEDICINE
Payer: MEDICAID

## 2024-06-10 VITALS
OXYGEN SATURATION: 97 % | WEIGHT: 315 LBS | HEART RATE: 84 BPM | BODY MASS INDEX: 49.44 KG/M2 | RESPIRATION RATE: 16 BRPM | SYSTOLIC BLOOD PRESSURE: 139 MMHG | TEMPERATURE: 99 F | DIASTOLIC BLOOD PRESSURE: 97 MMHG | HEIGHT: 67 IN

## 2024-06-10 DIAGNOSIS — L02.91 ABSCESS: Primary | ICD-10-CM

## 2024-06-10 PROCEDURE — 10060 I&D ABSCESS SIMPLE/SINGLE: CPT

## 2024-06-10 PROCEDURE — 99282 EMERGENCY DEPT VISIT SF MDM: CPT

## 2024-06-11 NOTE — FIRST PROVIDER EVALUATION
"Medical screening examination initiated.  I have conducted a focused provider triage encounter, findings are as follows:    Brief history of present illness:  arrived to ED for wound check. Reports he has an abscess on his L upper back. Has been seen several times in ER for same complaint. Abscess is draining. Currently on Clindamycin.     Vitals:    06/10/24 2041   BP: 135/84   Pulse: 100   Resp: 16   Temp: 98.5 °F (36.9 °C)   TempSrc: Oral   SpO2: 98%   Weight: (!) 147.9 kg (326 lb)   Height: 5' 7" (1.702 m)       Pertinent physical exam:  awake, alert, has non-labored breathing    Brief workup plan:  provider evaluation    Preliminary workup initiated; this workup will be continued and followed by the physician or advanced practice provider that is assigned to the patient when roomed.  "

## 2024-06-11 NOTE — ED PROVIDER NOTES
"Encounter Date: 6/10/2024       History     Chief Complaint   Patient presents with    Wound Check     Pt.  abscess drainage done on 6/6/2024, and would like a follow up. Pt. States abscess is still draining, and he has "been packing it," himself.  has been taking clindamycin as prescribed, -fever     See MDM    The history is provided by the patient. No  was used.     Review of patient's allergies indicates:   Allergen Reactions    Grass pollen-june grass standard Other (See Comments)     Pollen.  becomes "very irritable"      Past Medical History:   Diagnosis Date    Allergy 4/25/2010    COVID-19 03/04/2024    Depression     Hypertension     Obesity, unspecified      History reviewed. No pertinent surgical history.  Family History   Problem Relation Name Age of Onset    Diabetes Sister Robert Cardona      Social History     Tobacco Use    Smoking status: Never    Smokeless tobacco: Never   Substance Use Topics    Alcohol use: Not Currently    Drug use: Never     Review of Systems   Constitutional:  Negative for fever.   Respiratory:  Negative for cough and shortness of breath.    Cardiovascular:  Negative for chest pain.   Gastrointestinal:  Negative for abdominal pain.   Genitourinary:  Negative for difficulty urinating and dysuria.   Musculoskeletal:  Negative for gait problem.   Skin:  Negative for color change.   Neurological:  Negative for dizziness, speech difficulty and headaches.   Psychiatric/Behavioral:  Negative for hallucinations and suicidal ideas.    All other systems reviewed and are negative.      Physical Exam     Initial Vitals [06/10/24 2041]   BP Pulse Resp Temp SpO2   135/84 100 16 98.5 °F (36.9 °C) 98 %      MAP       --         Physical Exam    Nursing note and vitals reviewed.  Constitutional: He appears well-developed and well-nourished.   HENT:   Head: Normocephalic.   Eyes: EOM are normal.   Neck: Neck supple.   Normal range of " "motion.  Cardiovascular:  Normal rate, regular rhythm, normal heart sounds and intact distal pulses.           Pulmonary/Chest: Breath sounds normal.   Abdominal: Abdomen is soft. Bowel sounds are normal.   Musculoskeletal:         General: Normal range of motion.      Cervical back: Normal range of motion and neck supple.     Neurological: He is alert and oriented to person, place, and time. He has normal strength.   Skin: Skin is warm and dry. Capillary refill takes less than 2 seconds.   Left flank abscess   Psychiatric: He has a normal mood and affect. His behavior is normal. Judgment and thought content normal.         ED Course   I & D - Incision and Drainage    Date/Time: 6/10/2024 11:03 PM  Location procedure was performed: [facility] EMERGENCY DEPARTMENT    Performed by: Rick Gonsalez FNP  Authorized by: Cristofer Damico MD  Pre-operative diagnosis: Abscess  Post-operative diagnosis: Abscess  Consent Done: Yes  Consent: Verbal consent obtained.  Consent given by: patient  Patient understanding: patient states understanding of the procedure being performed  Relevant documents: relevant documents present and verified  Patient identity confirmed: , name, verbally with patient, provided demographic data and MRN  Time out: Immediately prior to procedure a "time out" was called to verify the correct patient, procedure, equipment, support staff and site/side marked as required.  Type: abscess  Body area: trunk (Left flank)  Anesthesia: local infiltration    Anesthesia:  Local Anesthetic: lidocaine 1% without epinephrine    Patient sedated: no  Scalpel size: 11  Incision type: single straight  Incision depth: dermal  Complexity: simple  Drainage: purulent  Drainage amount: copious  Wound treatment: incision, drainage, expression of material, wound left open, wound packed and deloculation  Packing material: 1/4 in gauze  Technical procedures used: Incision drainage  Complications: No  Estimated blood loss (mL): " 5  Specimens: No  Implants: No  Patient tolerance: Patient tolerated the procedure well with no immediate complications    Incision depth: dermal        Labs Reviewed - No data to display       Imaging Results    None          Medications - No data to display  Medical Decision Making  Historian:  Patient.  Patient is a 34-year-old male  that presents with abscess to left flank that has been present approximately 1 week. Associated symptoms nothing. Surrounding information is nothing. Exacerbated by nothing. Relieved by nothing. Patient treatment prior to arrival none. Risk factors include none. Other history pertaining to this complaint nothing.   Assessment:  See physical exam.  DD:  Abscess  ED Course: History was obtained.  Physical was performed.  Abscess was noted to the left flank.  I&D was performed.  Patient currently has pain medicine and antibiotics. Medical or surgical consults:  None. Social determinants that affect healthcare:  None.                                         Clinical Impression:  Final diagnoses:  [L02.91] Abscess - left flank  (Primary)          ED Disposition Condition    Discharge Stable          ED Prescriptions    None       Follow-up Information       Follow up With Specialties Details Why Contact Info    Your Primary Care Provider  Call in 3 days ed follow up              Rick Gonsalez FNP  06/10/24 1717

## 2024-06-14 ENCOUNTER — OUTPATIENT CASE MANAGEMENT (OUTPATIENT)
Dept: ADMINISTRATIVE | Facility: OTHER | Age: 34
End: 2024-06-14
Payer: MEDICAID

## 2024-06-14 NOTE — PROGRESS NOTES
Outpatient Care Management  Plan of Care Follow Up Visit    Patient: Ryan Mcbride  MRN: [id number]  Date of Service: 06/14/2024  Completed by: Mirna Shah RN  Referral Date: 05/07/2024    Reason for Visit   Patient presents with    OPCM RN Follow Up Call    Other       Brief Summary:   OPCM RN follow-up call completed.   Continue education on need for F/U appt, proper hand washing, S&S of infection.   Next Steps: Patient is in agreement to follow-up call on or around 7/2/2.

## 2024-06-17 ENCOUNTER — TELEPHONE (OUTPATIENT)
Dept: FAMILY MEDICINE | Facility: CLINIC | Age: 34
End: 2024-06-17
Payer: MEDICAID

## 2024-06-17 NOTE — TELEPHONE ENCOUNTER
----- Message from Anum Ward NP sent at 6/17/2024  7:11 AM CDT -----  Regarding: FW: Hospital F/U Appt Requested  Can we have the front office call him please.  ----- Message -----  From: Mirna Shah RN  Sent: 6/14/2024   2:43 PM CDT  To: Anum Ward NP  Subject: Hospital F/U Appt Requested                      Good Afternoon:    I spoke with Ryan for OPCM follow up this afternoon. He has had multiple visits to the ED related to an abscess, cellulitis & I&D to the flank area.     Can you kindly schedule Ryan a hospital follow up appointment to the clinic? He mentioned mornings work best & if available, he is off on Tuesday, 6/18?    Please call Ryan with appointment info.    Thanks & Happy Friday!      Mirna Shah RN   [facility]   [email or web address]  [phone]

## 2024-06-21 ENCOUNTER — OUTPATIENT CASE MANAGEMENT (OUTPATIENT)
Dept: ADMINISTRATIVE | Facility: OTHER | Age: 34
End: 2024-06-21
Payer: MEDICAID

## 2024-06-28 ENCOUNTER — OUTPATIENT CASE MANAGEMENT (OUTPATIENT)
Dept: ADMINISTRATIVE | Facility: OTHER | Age: 34
End: 2024-06-28
Payer: MEDICAID

## 2024-06-28 NOTE — PROGRESS NOTES
Outpatient Care Management  Plan of Care Follow Up Visit    Patient: Ryan Mcbride  MRN: [id number]  Date of Service: 06/28/2024  Completed by: Mirna Shah RN  Referral Date: 05/07/2024    Reason for Visit   Patient presents with    OPCM RN Follow Up Call    Other       Brief Summary:   OPCM RN follow-up call completed.   Reviewed and education provided on need for calling for provider F/U, keeping wound clean & dry, filling Rx today.  Next Steps: Pt is in agreement to follow up with in 3 weeks on or around 7/19 to discuss pt status; success of obtaining PCP appt; contacting [employer] for financial assistance,

## 2024-07-01 DIAGNOSIS — F32.A DEPRESSION, UNSPECIFIED DEPRESSION TYPE: Chronic | ICD-10-CM

## 2024-07-01 RX ORDER — BUPROPION HYDROCHLORIDE 150 MG/1
150 TABLET ORAL DAILY
Qty: 90 TABLET | Refills: 3 | Status: SHIPPED | OUTPATIENT
Start: 2024-07-01 | End: 2025-07-01

## 2024-07-16 ENCOUNTER — TELEPHONE (OUTPATIENT)
Dept: FAMILY MEDICINE | Facility: CLINIC | Age: 34
End: 2024-07-16
Payer: MEDICAID

## 2024-07-16 NOTE — TELEPHONE ENCOUNTER
----- Message from Montserrat Smalls sent at 7/16/2024 10:01 AM CDT -----  Regarding: refill  Type:  RX Refill Request    Who Called: patient  Refill or New Rx:refill   RX Name and Strength:wellbutrin XL    Preferred Pharmacy with phone number:[facility] [address] [id number] [facility]    Would the patient rather a call back or a response via [facility]? callback  Best Call Back Number:[phone]  Additional Information:

## 2024-07-18 ENCOUNTER — TELEPHONE (OUTPATIENT)
Dept: FAMILY MEDICINE | Facility: CLINIC | Age: 34
End: 2024-07-18
Payer: MEDICAID

## 2024-07-18 NOTE — TELEPHONE ENCOUNTER
----- Message from Montserrat Smalls sent at 7/17/2024  2:46 PM CDT -----  Regarding: pa needed  Who Called: Ryan Mcbride    Refill or New Rx:Refill  RX Name and Strength: wellbutrin     List of preferred pharmacies on file (remove unneeded): [unfilled]  If different Pharmacy is requested, enter Pharmacy information here including location and phone number: [facility] - [address] FLO[address] 1           Preferred Method of Contact: [employer]/Alexander message  Patient's Preferred Phone Number on File: [phone]   Best Call Back Number, if different:

## 2024-07-18 NOTE — TELEPHONE ENCOUNTER
Called and spoke with patient. Verified . Informed patient of Rx called in on 24 90 day supply with 3 refills. Verbalized understanding.

## 2024-07-23 ENCOUNTER — OUTPATIENT CASE MANAGEMENT (OUTPATIENT)
Dept: ADMINISTRATIVE | Facility: OTHER | Age: 34
End: 2024-07-23
Payer: MEDICAID

## 2024-07-23 ENCOUNTER — TELEPHONE (OUTPATIENT)
Dept: FAMILY MEDICINE | Facility: CLINIC | Age: 34
End: 2024-07-23

## 2024-07-23 NOTE — TELEPHONE ENCOUNTER
----- Message from Kathleen Hollingsworth LPN sent at 6/17/2024  2:36 PM CDT -----  Regarding: FW: [facility] Appt Requested  Please contact patient to get him scheduled.  ----- Message -----  From: Anum Ward NP  Sent: 6/17/2024   7:12 AM CDT  To: Kathleen Hollingsworth LPN  Subject: FW: [facility] Appt Requested                  Can we have the front office call him please.  ----- Message -----  From: Mirna Shah RN  Sent: 6/14/2024   2:43 PM CDT  To: Anum Ward NP  Subject: [facility] Appt Requested                      Good Afternoon:    I spoke with Ryan for OPCM follow up this afternoon. He has had multiple visits to the ED related to an abscess, cellulitis & I&D to the flank area.     Can you kindly schedule Ryan a hospital follow up appointment to the clinic? He mentioned mornings work best & if available, he is off on Tuesday, 6/18?    Please call Ryan with appointment info.    Thanks & Happy Friday!      Mirna Shah RN   [facility]   [email or web address]  [phone]

## 2024-07-23 NOTE — PROGRESS NOTES
Outpatient Care Management  Plan of Care Follow Up Visit    Patient: Ryan Mcbride  MRN: [id number]  Date of Service: 07/23/2024  Completed by: Mirna Shah RN  Referral Date: 05/07/2024    Reason for Visit   Patient presents with    OPCM RN Follow Up Call    Other       Brief Summary:   OPCM RN follow-up call completed.   Discussed pt health status, reviewed upcoming appointments, and education provided on medication.   Next Steps:   Pt agrees to f/u call in 3 weeks on or around 8/13 to discuss:  Side effects of NSAIDS, success of Call to insurance r/t B/P monitor; success of attending F/U, assess pain.

## 2024-07-31 ENCOUNTER — HOSPITAL ENCOUNTER (EMERGENCY)
Facility: HOSPITAL | Age: 34
Discharge: HOME | End: 2024-07-31
Attending: STUDENT IN AN ORGANIZED HEALTH CARE EDUCATION/TRAINING PROGRAM

## 2024-07-31 VITALS
TEMPERATURE: 98 F | DIASTOLIC BLOOD PRESSURE: 91 MMHG | SYSTOLIC BLOOD PRESSURE: 154 MMHG | HEIGHT: 67 IN | HEART RATE: 95 BPM | BODY MASS INDEX: 49.44 KG/M2 | WEIGHT: 315 LBS | RESPIRATION RATE: 18 BRPM | OXYGEN SATURATION: 100 %

## 2024-07-31 DIAGNOSIS — M25.562 LEFT KNEE PAIN: ICD-10-CM

## 2024-07-31 PROCEDURE — 96372 THER/PROPH/DIAG INJ SC/IM: CPT | Performed by: NURSE PRACTITIONER

## 2024-07-31 PROCEDURE — 99284 EMERGENCY DEPT VISIT MOD MDM: CPT | Mod: 25

## 2024-07-31 PROCEDURE — 63600175 PHARM REV CODE 636 W HCPCS: Performed by: NURSE PRACTITIONER

## 2024-07-31 RX ORDER — HYDROCODONE BITARTRATE AND ACETAMINOPHEN 7.5; 325 MG/1; MG/1
1 TABLET ORAL EVERY 6 HOURS PRN
Qty: 16 TABLET | Refills: 0 | Status: SHIPPED | OUTPATIENT
Start: 2024-07-31

## 2024-07-31 RX ORDER — KETOROLAC TROMETHAMINE 30 MG/ML
60 INJECTION, SOLUTION INTRAMUSCULAR; INTRAVENOUS
Status: COMPLETED | OUTPATIENT
Start: 2024-07-31 | End: 2024-07-31

## 2024-07-31 RX ORDER — DICLOFENAC SODIUM 50 MG/1
50 TABLET, DELAYED RELEASE ORAL 3 TIMES DAILY PRN
Qty: 15 TABLET | Refills: 0 | Status: SHIPPED | OUTPATIENT
Start: 2024-07-31 | End: 2024-08-05

## 2024-07-31 RX ADMIN — KETOROLAC TROMETHAMINE 60 MG: 30 INJECTION, SOLUTION INTRAMUSCULAR at 09:07

## 2024-08-16 ENCOUNTER — OUTPATIENT CASE MANAGEMENT (OUTPATIENT)
Dept: ADMINISTRATIVE | Facility: OTHER | Age: 34
End: 2024-08-16

## 2024-08-16 NOTE — PROGRESS NOTES
INDICATION: Syncope     



COMPARISON: CT brain March 10, 2017



TECHNIQUE: Noncontrast axial source images were acquired from the skull base to the

vertex.



FINDINGS:



Ventricles/sulci: There is mild ventricular prominence, unchanged. There is a right-sided

shunt catheter entering from a right frontal approach. The tip of the catheter is

unchanged in position in the body of the right lateral ventricle.



Brain parenchyma: There is no focal parenchymal finding, evidence of intracranial mass, 

or intracranial mass effect.



Intracranial hemorrhage:None.



Extra-axial spaces: There are no abnormal extra axial fluid collections or evidence of

extra-axial mass.



Calvarium: No acute calvarial findings.



Scalp: There is no evidence of scalp or extracalvarial soft tissue abnormality.



Paranasal sinuses/mastoid: The paranasal sinuses and mastoid air cells are clear.



Other: None.



IMPRESSION: RIGHT-SIDED SHUNT CATHETER AND MILD VENTRICULOMEGALY, BOTH UNCHANGED. Outpatient Care Management  Plan of Care Follow Up Visit    Patient: Ryan Mcbride  MRN: [id number]  Date of Service: 08/16/2024  Completed by: Mirna Shah RN  Referral Date: 05/07/2024    Reason for Visit   Patient presents with    OPCM RN Follow Up Call    Other       Brief Summary:   OPCM RN follow-up call completed.   Reviewed  and education provided on:   Attend F/U appt.   Monitor for unresolved or worsening S/S of elevated B/P.  Physical Activity on days off.     Next Steps:   Ryan agrees to follow up call within 2 weeks on or around 8/27. Will discuss:  Success of speaking to SW for assistance.  Success of attending F/U appt.  Explore getting B/P monitor.   Side effects of anti-inflammatory meds.  Assess dietary intake.    Hydration.   Decision on exercising on days off.

## 2024-08-19 ENCOUNTER — OFFICE VISIT (OUTPATIENT)
Dept: FAMILY MEDICINE | Facility: CLINIC | Age: 34
End: 2024-08-19

## 2024-08-19 VITALS
BODY MASS INDEX: 49.44 KG/M2 | OXYGEN SATURATION: 96 % | WEIGHT: 315 LBS | SYSTOLIC BLOOD PRESSURE: 167 MMHG | RESPIRATION RATE: 18 BRPM | HEIGHT: 67 IN | TEMPERATURE: 99 F | HEART RATE: 88 BPM | DIASTOLIC BLOOD PRESSURE: 114 MMHG

## 2024-08-19 DIAGNOSIS — R53.83 FATIGUE, UNSPECIFIED TYPE: ICD-10-CM

## 2024-08-19 DIAGNOSIS — I10 HYPERTENSION, UNSPECIFIED TYPE: ICD-10-CM

## 2024-08-19 DIAGNOSIS — G89.29 CHRONIC PAIN OF LEFT KNEE: Primary | ICD-10-CM

## 2024-08-19 DIAGNOSIS — M25.562 CHRONIC PAIN OF LEFT KNEE: Primary | ICD-10-CM

## 2024-08-19 DIAGNOSIS — F32.A DEPRESSION, UNSPECIFIED DEPRESSION TYPE: Chronic | ICD-10-CM

## 2024-08-19 DIAGNOSIS — I10 PRIMARY HYPERTENSION: ICD-10-CM

## 2024-08-19 DIAGNOSIS — N52.8 OTHER MALE ERECTILE DYSFUNCTION: Chronic | ICD-10-CM

## 2024-08-19 LAB — TESTOST SERPL-MCNC: 192.52 NG/DL (ref 240.24–870.68)

## 2024-08-19 PROCEDURE — 99214 OFFICE O/P EST MOD 30 MIN: CPT | Mod: S$PBB,,,

## 2024-08-19 PROCEDURE — 84403 ASSAY OF TOTAL TESTOSTERONE: CPT

## 2024-08-19 PROCEDURE — 36415 COLL VENOUS BLD VENIPUNCTURE: CPT

## 2024-08-19 PROCEDURE — 99214 OFFICE O/P EST MOD 30 MIN: CPT | Mod: PBBFAC,PN

## 2024-08-19 PROCEDURE — 96372 THER/PROPH/DIAG INJ SC/IM: CPT | Mod: PBBFAC,PN

## 2024-08-19 RX ORDER — TADALAFIL 10 MG/1
10 TABLET ORAL DAILY PRN
Qty: 30 TABLET | Refills: 5 | Status: SHIPPED | OUTPATIENT
Start: 2024-08-19 | End: 2025-08-19

## 2024-08-19 RX ORDER — DICLOFENAC POTASSIUM 50 MG/1
50 TABLET, FILM COATED ORAL
COMMUNITY
Start: 2024-08-01 | End: 2024-08-19 | Stop reason: SDUPTHER

## 2024-08-19 RX ORDER — HYDROCHLOROTHIAZIDE 25 MG/1
25 TABLET ORAL DAILY
Qty: 90 TABLET | Refills: 3 | Status: SHIPPED | OUTPATIENT
Start: 2024-08-19 | End: 2025-08-19

## 2024-08-19 RX ORDER — KETOROLAC TROMETHAMINE 30 MG/ML
30 INJECTION, SOLUTION INTRAMUSCULAR; INTRAVENOUS ONCE
Status: COMPLETED | OUTPATIENT
Start: 2024-08-19 | End: 2024-08-19

## 2024-08-19 RX ORDER — BUPROPION HYDROCHLORIDE 150 MG/1
150 TABLET ORAL DAILY
Qty: 90 TABLET | Refills: 3 | Status: SHIPPED | OUTPATIENT
Start: 2024-08-19 | End: 2025-08-19

## 2024-08-19 RX ORDER — DICLOFENAC POTASSIUM 50 MG/1
50 TABLET, FILM COATED ORAL 2 TIMES DAILY PRN
Qty: 60 TABLET | Refills: 1 | Status: SHIPPED | OUTPATIENT
Start: 2024-08-19 | End: 2024-11-17

## 2024-08-19 RX ORDER — LOSARTAN POTASSIUM 50 MG/1
50 TABLET ORAL DAILY
Qty: 90 TABLET | Refills: 0 | Status: SHIPPED | OUTPATIENT
Start: 2024-08-19 | End: 2024-11-17

## 2024-08-19 RX ADMIN — KETOROLAC TROMETHAMINE 30 MG: 30 INJECTION, SOLUTION INTRAMUSCULAR; INTRAVENOUS at 10:08

## 2024-08-19 NOTE — PROGRESS NOTES
Patient Name: Ryan Mcbride     : 1990    MRN: [id number]     Subjective:     Patient ID: Ryan Mcbride is a 34 y.o. male.    Chief Complaint:   Chief Complaint   Patient presents with    Follow-up     Patient requesting appointment. States left knee has been bothering him xOne week. States went to ED and received an injection. States needs an additional medicine to give him energy. States wants to start losing weight. Requesting med refills.         HPI: 2024:  Patient requested appointment today after multiple ER visits per patient for left knee pain, patient did have imaging performed in emergency department that did reveal degenerative changes.  Patient currently does not have any insurance.  At length discussion with patient about need to apply for financial assistance as this would be beneficial for further workup of his complaint.  At length discussion with patient about weight loss.  Additionally patient is hypertensive in clinic today, patient has not been taking any of his previously prescribed medications.  States that he thought he had completed them when medications ran out.  At length discussion about medication compliance and need to continue medications for best optimal outcomes. Patient denies chest pain, palpitations, and shortness of breath.  Patient denies fever, night sweats, chills, nausea, vomiting, diarrhea, constipation, weight loss, and changes in appetite.      2024: presents to clinic today, states he is still not able to have surgery for hernia. Is requesting to stop his amlodipine secondary to leg swelling. Feared condition not present on exam today. Requesting Toradol injections, states he is having flare up of his chronic back pain. Denies red flag symptoms. Patient denies chest pain, palpitations, and shortness of breath.  Patient denies fever, night sweats, chills, nausea, vomiting, diarrhea, constipation, weight loss, and changes in appetite.        ROS:  "      12 point review of systems conducted, negative except as stated in the history of present illness. See HPI for details.    History:     Past Medical History:   Diagnosis Date    Allergy 4/25/2010    COVID-19 03/04/2024    Depression     Hypertension     Obesity, unspecified         History reviewed. No pertinent surgical history.    Family History   Problem Relation Name Age of Onset    Diabetes Sister Robert Cardona         Social History     Tobacco Use    Smoking status: Never    Smokeless tobacco: Never   Substance and Sexual Activity    Alcohol use: Not Currently    Drug use: Never    Sexual activity: Yes     Partners: Female     Birth control/protection: None       Current Outpatient Medications   Medication Instructions    buPROPion (WELLBUTRIN XL) 150 mg, Oral, Daily    diclofenac (CATAFLAM) 50 mg, Oral, 2 times daily PRN    hydroCHLOROthiazide (HYDRODIURIL) 25 mg, Oral, Daily    HYDROcodone-acetaminophen (NORCO) 7.5-325 mg per tablet 1 tablet, Oral, Every 6 hours PRN    losartan (COZAAR) 50 mg, Oral, Daily    tadalafiL (CIALIS) 10 mg, Oral, Daily PRN        Review of patient's allergies indicates:   Allergen Reactions    Grass pollen-june grass standard Other (See Comments)     Pollen. States becomes "very irritable"        Objective:     Visit Vitals  BP (!) 167/114 (BP Location: Right arm, Patient Position: Sitting)   Pulse 88   Temp 98.5 °F (36.9 °C) (Oral)   Resp 18   Ht 5' 7" (1.702 m)   Wt (!) 158.4 kg (349 lb 3.2 oz)   SpO2 96%   BMI 54.69 kg/m²       Physical Examination:     Physical Exam  Constitutional:       General: He is not in acute distress.     Appearance: Normal appearance. He is obese. He is not ill-appearing.   Cardiovascular:      Rate and Rhythm: Normal rate and regular rhythm.      Heart sounds: Normal heart sounds.   Pulmonary:      Effort: Pulmonary effort is normal. No respiratory distress.      Breath sounds: Normal breath sounds.   Musculoskeletal:      Cervical back: " Normal range of motion.      Left knee: Decreased range of motion.   Skin:     General: Skin is warm and dry.   Neurological:      Mental Status: He is alert and oriented to person, place, and time.   Psychiatric:         Mood and Affect: Mood normal.         Behavior: Behavior normal.           Assessment:          ICD-10-CM ICD-9-CM   1. Chronic pain of left knee  M25.562 719.46    G89.29 338.29   2. Hypertension, unspecified type  I10 401.9   3. Depression, unspecified depression type  F32.A 311   4. Primary hypertension  I10 401.9   5. Other male erectile dysfunction  N52.8 607.84   6. Fatigue, unspecified type  R53.83 780.79        Plan:     1. Chronic pain of left knee  -     Ambulatory referral/consult to Orthopedics; Future; Expected date: 08/19/2024  -     ketorolac injection 30 mg    2. Hypertension, unspecified type  Overview:  Low Sodium Diet (Dash Diet - less than 2 grams of sodium per day).  Monitor Blood Pressure daily and log. Report any consistent numbers greater than 140/90.  Smoking Cessation encouraged to aid in BP reduction.  Maintain healthy weight with goal BMI <30. Exercise 30 minutes per day 5 days per week      Orders:  -     losartan (COZAAR) 50 MG tablet; Take 1 tablet (50 mg total) by mouth once daily.  Dispense: 90 tablet; Refill: 0    3. Depression, unspecified depression type  Overview:    Read positive daily meditations, avoid negative media, set healthy boundaries.  Exercise daily, keep consistent sleep pattern, eat a healthy diet.  Establish good social support, make changes to reduce stress.  Reports any symptoms of suicidal/homicidal ideations or self harm immediately, if clinic is closed go to nearest emergency room.      Orders:  -     buPROPion (WELLBUTRIN XL) 150 MG TB24 tablet; Take 1 tablet (150 mg total) by mouth once daily.  Dispense: 90 tablet; Refill: 3    4. Primary hypertension  Overview:  Low Sodium Diet (Dash Diet - less than 2 grams of sodium per day).  Monitor  Blood Pressure daily and log. Report any consistent numbers greater than 140/90.  Smoking Cessation encouraged to aid in BP reduction.  Maintain healthy weight with goal BMI <30. Exercise 30 minutes per day 5 days per week      Orders:  -     hydroCHLOROthiazide (HYDRODIURIL) 25 MG tablet; Take 1 tablet (25 mg total) by mouth once daily.  Dispense: 90 tablet; Refill: 3    5. Other male erectile dysfunction  Overview:  Monitor for prolonged erections, if occur go to ER immediately.  You may need to stop smoking or using drugs, drink less alcohol, lose weight, reduce stress, and start exercising. There are pills that may help you get an erection. If these do not work, there are other medicines that can be injected or inserted into your penis. Vacuum pump or inflatable devices may also help you manage your ED.      Orders:  -     tadalafiL (CIALIS) 10 MG tablet; Take 1 tablet (10 mg total) by mouth daily as needed for Erectile Dysfunction.  Dispense: 30 tablet; Refill: 5    6. Fatigue, unspecified type  -     Testosterone; Future; Expected date: 08/19/2024  -     Testosterone; Future; Expected date: 08/19/2024    Other orders  -     diclofenac (CATAFLAM) 50 MG tablet; Take 1 tablet (50 mg total) by mouth 2 (two) times daily as needed (pain).  Dispense: 60 tablet; Refill: 1         Follow up in about 3 months (around 11/19/2024).    Future Appointments   Date Time Provider Department Center   1/14/2025 12:00 PM Anum Ward NP [facility]        Anum Ward NP      This note was created with the assistance of a voice recognition software or phone dictation. There may be transcription errors as a result of using this technology however minimal. Effort has been made to assure accuracy of transcription but any obvious errors or omissions should be clarified with the author of the document

## 2024-08-20 ENCOUNTER — TELEPHONE (OUTPATIENT)
Dept: FAMILY MEDICINE | Facility: CLINIC | Age: 34
End: 2024-08-20

## 2024-08-20 DIAGNOSIS — R53.83 FATIGUE, UNSPECIFIED TYPE: Primary | ICD-10-CM

## 2024-08-22 ENCOUNTER — OUTPATIENT CASE MANAGEMENT (OUTPATIENT)
Dept: ADMINISTRATIVE | Facility: OTHER | Age: 34
End: 2024-08-22

## 2024-08-22 ENCOUNTER — TELEPHONE (OUTPATIENT)
Dept: FAMILY MEDICINE | Facility: CLINIC | Age: 34
End: 2024-08-22

## 2024-08-22 ENCOUNTER — CLINICAL SUPPORT (OUTPATIENT)
Dept: FAMILY MEDICINE | Facility: CLINIC | Age: 34
End: 2024-08-22

## 2024-08-22 DIAGNOSIS — R53.83 FATIGUE, UNSPECIFIED TYPE: ICD-10-CM

## 2024-08-22 DIAGNOSIS — R79.89 LOW TESTOSTERONE IN MALE: Primary | ICD-10-CM

## 2024-08-22 LAB — TESTOST SERPL-MCNC: 204.93 NG/DL (ref 240.24–870.68)

## 2024-08-22 PROCEDURE — 99211 OFF/OP EST MAY X REQ PHY/QHP: CPT | Mod: PBBFAC,PN

## 2024-08-22 PROCEDURE — 84403 ASSAY OF TOTAL TESTOSTERONE: CPT

## 2024-08-22 PROCEDURE — 36415 COLL VENOUS BLD VENIPUNCTURE: CPT

## 2024-08-22 RX ORDER — TESTOSTERONE CYPIONATE 200 MG/ML
200 INJECTION, SOLUTION INTRAMUSCULAR
Qty: 2 ML | Refills: 2 | Status: SHIPPED | OUTPATIENT
Start: 2024-08-22 | End: 2025-02-20

## 2024-08-22 RX ORDER — TESTOSTERONE CYPIONATE 200 MG/ML
200 INJECTION, SOLUTION INTRAMUSCULAR
Qty: 2 ML | Refills: 2 | Status: SHIPPED | OUTPATIENT
Start: 2024-08-22 | End: 2024-08-22

## 2024-08-23 NOTE — TELEPHONE ENCOUNTER
Called and spoke with patient. Verified . Instructions and information given. Patient states is aware Rx is ready at pharmacy. Patient asked about S/E and if he needs this Rx. Most common S/E discussed with patient. Patient made aware blood work was collected for patient d/t c/o at [facility] and testosterone level result was low.  Verbalized understanding. Patient to call clinic with any questions or concerns.

## 2024-08-23 NOTE — PROGRESS NOTES
Outpatient Care Management   - Patient Assessment    Patient: Ryan Mcbride  MRN:  [id number]  Date of Service:  8/23/2024  Completed by:  Zakiya Hong LCSW  Referral Date: 05/07/2024    Reason for Visit   Patient presents with    Social Work Assessment       Brief Summary:  received a referral from USHA Chavis RN for the following HR SW psychosocial needs financial assistance, b/p cuff, and insurance issues.  The patient also requests assistance with n/a. LCSW reviewed pt's chart in preparation for call to pt. LCSW contacted pt via phone to complete social work assessment. LCSW spoke with pt, introduced self, and explained the purpose of the call. Pt voiced agreement with participating in program. Pt is a 35yo male who currently resides with his wife. Pt reports that he is independent with all ADLs and IADLs. Pt reports that he and his wife are working on communication in their relationship. Pt reports that he has past dx of mood disorder and clinical depression that were treated using Zoloft. Pt voiced that his current dx is depression only and he takes Wellbutrin daily. LCSW discussed referring pt for therapy/counseling; however, pt declined citing he does not have time. LCSW briefly addressed the stigma of seeking therapy/counseling in the [place] community. Pt reports that his current financial concerns are outstanding medical bills and not having medical insurance. Pt reports that he has numerous medical bills as a result of ER visits and inpatient admits. He also voiced that he had medicaid from April 1 - June 30, but was dropped from the program on July 1st. Pt reports that he can afford his bills based upon his current income and does not have any disconnect notices for utilities. Pt also denies food insecurity. LCSW informed pt of [facility] Patient Financial Assistance Program and encouraged him to call for screening. [facility]  [facility]. LCSW also informed pt that [facility] has sold medical debt to [employer] and his bills may be forgiven. LCSW encouraged [facility] voiced understanding. LCSW offered to contact [facility] to coordinate assistance for medicaid application and information about the insurance marketplace. Pt agreed. [facility] and received response that [facility] can assist. Pt [facility] agreement with [facility] information [facility]. LCSW provided reflective listening and supportive counseling as pt shared feelings about his marriage, work satisfaction, and working on his physical health. Care plan was created in collaboration with patient/caregiver input.   completed the SDOH questionnaire.

## 2024-08-29 ENCOUNTER — OUTPATIENT CASE MANAGEMENT (OUTPATIENT)
Dept: ADMINISTRATIVE | Facility: OTHER | Age: 34
End: 2024-08-29

## 2024-08-29 NOTE — PROGRESS NOTES
Outpatient Care Management  Plan of Care Follow Up Visit    Patient: Ryan Mcbride  MRN: [id number]  Date of Service: 08/29/2024  Completed by: Mirna Shah RN  Referral Date: 05/07/2024    Reason for Visit   Patient presents with    OPCM RN Follow Up Call    Other       Brief Summary:   OPCM RN follow-up call completed.   Reviewed and education provided on:   Reinforced Education on when to seek medical attention for unresolved or worsening of symptoms of elevated B/P  Importance of Med Mgnt.     Next Steps:   Ryan agrees to follow up call within 2 weeks on or around 9/12. Will discuss:  Success of stretching.   Benefit of Adequate Hydration & Best Drinks for Hydration.    Portion size of Salad [2 baseballs].   Success of increasing physical activity to 4 days a week [If unsuccessful, suggest to increase time].   Explore getting B/P monitor.   Success w/ speaking w/ MAVERICK Sigala  Success w/ Financial Asst.

## 2024-08-30 ENCOUNTER — OUTPATIENT CASE MANAGEMENT (OUTPATIENT)
Dept: ADMINISTRATIVE | Facility: OTHER | Age: 34
End: 2024-08-30

## 2024-09-06 ENCOUNTER — OUTPATIENT CASE MANAGEMENT (OUTPATIENT)
Dept: ADMINISTRATIVE | Facility: OTHER | Age: 34
End: 2024-09-06

## 2024-09-10 ENCOUNTER — OUTPATIENT CASE MANAGEMENT (OUTPATIENT)
Dept: ADMINISTRATIVE | Facility: OTHER | Age: 34
End: 2024-09-10

## 2024-09-17 ENCOUNTER — OUTPATIENT CASE MANAGEMENT (OUTPATIENT)
Dept: ADMINISTRATIVE | Facility: OTHER | Age: 34
End: 2024-09-17

## 2024-09-17 NOTE — PROGRESS NOTES
Outpatient Care Management  Plan of Care Follow Up Visit    Patient: Ryan Mcbride  MRN: [id number]  Date of Service: 09/17/2024  Completed by: Mirna Shah RN  Referral Date: 05/07/2024    Reason for Visit   Patient presents with    Other    OPCM RN Follow Up Call       Brief Summary:   OPCM RN follow-up call completed.   Reviewed and education provided on:   Benefits of performing exercise to decrease burn out   Stress & its effects on HTN      Next Steps:   Ryan agrees to follow up call within 2 weeks on or around 10/1. Will discuss:  Health Status  Success of filling refills.  Success w/ PAP.   Status of Medicaid Jake  If qualified for any resources to asst w/ rent, internet.  SBenefit of Adequate Hydration & Best Drinks for Hydration.    Portion size of Salad [2 baseballs].

## 2024-09-18 ENCOUNTER — TELEPHONE (OUTPATIENT)
Dept: PHARMACY | Facility: CLINIC | Age: 34
End: 2024-09-18

## 2024-09-18 ENCOUNTER — OUTPATIENT CASE MANAGEMENT (OUTPATIENT)
Dept: ADMINISTRATIVE | Facility: OTHER | Age: 34
End: 2024-09-18

## 2024-09-18 NOTE — TELEPHONE ENCOUNTER
----- Message from Mirna Shah RN sent at 9/18/2024  2:18 PM CDT -----  Regarding: Med Assistance  Kindly reach out to assist at your earliest convenience. Ryan does not have insurance or medicaid.     Best time to reach is in the am.    Thanks,      Mirna Shah RN   [facility]   [email or web address]  [phone]

## 2024-09-20 ENCOUNTER — HOSPITAL ENCOUNTER (EMERGENCY)
Facility: HOSPITAL | Age: 34
Discharge: HOME | End: 2024-09-20
Attending: EMERGENCY MEDICINE
Payer: COMMERCIAL

## 2024-09-20 VITALS
RESPIRATION RATE: 20 BRPM | TEMPERATURE: 98 F | DIASTOLIC BLOOD PRESSURE: 107 MMHG | WEIGHT: 315 LBS | HEIGHT: 67 IN | HEART RATE: 87 BPM | BODY MASS INDEX: 49.44 KG/M2 | SYSTOLIC BLOOD PRESSURE: 147 MMHG | OXYGEN SATURATION: 95 %

## 2024-09-20 DIAGNOSIS — R05.9 COUGH: ICD-10-CM

## 2024-09-20 LAB
FLUAV AG UPPER RESP QL IA.RAPID: NOT DETECTED
FLUBV AG UPPER RESP QL IA.RAPID: NOT DETECTED
SARS-COV-2 RNA RESP QL NAA+PROBE: NOT DETECTED

## 2024-09-20 PROCEDURE — 0240U COVID/FLU A&B PCR: CPT | Performed by: NURSE PRACTITIONER

## 2024-09-20 PROCEDURE — 99283 EMERGENCY DEPT VISIT LOW MDM: CPT | Mod: 25

## 2024-09-20 RX ORDER — FLUTICASONE PROPIONATE 50 MCG
1 SPRAY, SUSPENSION (ML) NASAL 2 TIMES DAILY PRN
Qty: 15 G | Refills: 0 | Status: SHIPPED | OUTPATIENT
Start: 2024-09-20

## 2024-09-20 RX ORDER — CETIRIZINE HYDROCHLORIDE 10 MG/1
10 TABLET ORAL DAILY
Qty: 30 TABLET | Refills: 0 | Status: SHIPPED | OUTPATIENT
Start: 2024-09-20 | End: 2024-10-20

## 2024-09-20 RX ORDER — KETOROLAC TROMETHAMINE 30 MG/ML
30 INJECTION, SOLUTION INTRAMUSCULAR; INTRAVENOUS
Status: DISCONTINUED | OUTPATIENT
Start: 2024-09-20 | End: 2024-09-20 | Stop reason: HOSPADM

## 2024-09-20 NOTE — Clinical Note
"Rayn"Jessie Mcbride was seen and treated in our emergency department on 9/20/2024.  He may return to work on 09/21/2024.       If you have any questions or concerns, please don't hesitate to call.      Harriet Tran, YURI"

## 2024-09-20 NOTE — ED PROVIDER NOTES
"Encounter Date: 9/20/2024       History     Chief Complaint   Patient presents with    Headache     C/O "sinus pressure" since yesterday.  Stated he has had a cough, congestion and headache since inhaling "strong cleaning solution." Was congested prior to inhaling the chemical     See MDM    The history is provided by the patient. No  was used.     Review of patient's allergies indicates:   Allergen Reactions    Grass pollen-june grass standard Other (See Comments)     Pollen. States becomes "very irritable"      Past Medical History:   Diagnosis Date    Allergy 4/25/2010    COVID-19 03/04/2024    Depression     Hypertension     Obesity, unspecified      History reviewed. No pertinent surgical history.  Family History   Problem Relation Name Age of Onset    Diabetes Sister Robert Cardona      Social History     Tobacco Use    Smoking status: Never    Smokeless tobacco: Never   Substance Use Topics    Alcohol use: Not Currently    Drug use: Never     Review of Systems   HENT:  Positive for congestion and sinus pressure.    Respiratory:  Positive for cough.    Neurological:  Positive for headaches.   All other systems reviewed and are negative.      Physical Exam     Initial Vitals   BP Pulse Resp Temp SpO2   09/20/24 1038 09/20/24 1036 09/20/24 1036 09/20/24 1036 09/20/24 1036   (!) 155/96 91 20 98 °F (36.7 °C) 97 %      MAP       --                Physical Exam    Nursing note and vitals reviewed.  Constitutional: He appears well-developed and well-nourished.   HENT:   Right Ear: Tympanic membrane and ear canal normal.   Left Ear: Tympanic membrane and ear canal normal.   Nose: Right sinus exhibits no maxillary sinus tenderness and no frontal sinus tenderness. Left sinus exhibits no maxillary sinus tenderness and no frontal sinus tenderness.   Mouth/Throat: No oropharyngeal exudate, posterior oropharyngeal edema, posterior oropharyngeal erythema or tonsillar abscesses.   Mild ethmoid sinus " pressure    Eyes: Conjunctivae are normal.   Cardiovascular:  Normal rate, regular rhythm and normal heart sounds.           Pulmonary/Chest: Breath sounds normal. No respiratory distress.   Abdominal:   Morbidly obese   Musculoskeletal:         General: Normal range of motion.     Neurological: He is alert and oriented to person, place, and time. He has normal strength.   Skin: Skin is warm and dry.   Psychiatric: He has a normal mood and affect.         ED Course   Procedures  Labs Reviewed   COVID/FLU A&B PCR - Normal       Result Value    Influenza A PCR Not Detected      Influenza B PCR Not Detected      SARS-CoV-2 PCR Not Detected      Narrative:     The Xpert Xpress SARS-CoV-2/FLU/RSV plus is a rapid, multiplexed real-time PCR test intended for the simultaneous qualitative detection and differentiation of SARS-CoV-2, Influenza A, Influenza B, and respiratory syncytial virus (RSV) viral RNA in either nasopharyngeal swab or nasal swab specimens.                Imaging Results              X-Ray Chest PA And Lateral (Final result)  Result time 09/20/24 11:11:00      Final result by Lillian Helton MD (09/20/24 11:11:00)                   Impression:      No acute cardiopulmonary abnormality.      Electronically signed by: Lillian Helton  Date:    09/20/2024  Time:    11:11               Narrative:    EXAMINATION:  XR CHEST PA AND LATERAL    CLINICAL HISTORY:  Cough, unspecified    TECHNIQUE:  Two views of the chest    COMPARISON:  02/25/2022    FINDINGS:  LINES AND TUBES: None    MEDIASTINUM AND SHANNA: The cardiac silhouette is normal.    LUNGS: No lobar consolidation. No edema.    PLEURA:No pleural effusion. No pneumothorax.    BONES: No acute osseous abnormality.                                       Medications   ketorolac injection 30 mg (has no administration in time range)     Medical Decision Making  35 y/o male presents with getting exposed to something at work (somebody put somecleaning stuff in  sink and the chemicals irritated his sinus). Now having sinus congestion, cough (although he states he had cough and congestion a day or two before the chemical exposure). Headache.    Covid/flu neg. Xray chest neg. He requested toradol injection. Will give.        Amount and/or Complexity of Data Reviewed  Labs: ordered. Decision-making details documented in ED Course.  Radiology: ordered. Decision-making details documented in ED Course.    Risk  Prescription drug management.      Additional MDM:   Differential Diagnosis:   Other: The following diagnoses were also considered and will be evaluated: sinusitis, pneumonia and covid.                                   Clinical Impression:  Final diagnoses:  [R05.9] Cough          ED Disposition Condition    Discharge Stable          ED Prescriptions       Medication Sig Dispense Start Date End Date Auth. Provider    fluticasone propionate (FLONASE) 50 mcg/actuation nasal spray 1 spray (50 mcg total) by Each Nostril route 2 (two) times daily as needed for Rhinitis. 15 g 9/20/2024 -- Harriet Tran FNP    cetirizine (ZYRTEC) 10 MG tablet Take 1 tablet (10 mg total) by mouth once daily. 30 tablet 9/20/2024 10/20/2024 Harriet Tran FNP          Follow-up Information       Follow up With Specialties Details Why Contact Info    Jazzy-Anum Thacker NP Family Medicine Call in 1 week As needed [address] [facility] [address] [phone]               Harriet Tran FNP  09/20/24 1177

## 2024-09-26 ENCOUNTER — HOSPITAL ENCOUNTER (EMERGENCY)
Facility: HOSPITAL | Age: 34
Discharge: HOME | End: 2024-09-26
Attending: STUDENT IN AN ORGANIZED HEALTH CARE EDUCATION/TRAINING PROGRAM
Payer: COMMERCIAL

## 2024-09-26 VITALS
DIASTOLIC BLOOD PRESSURE: 92 MMHG | HEIGHT: 67 IN | WEIGHT: 315 LBS | SYSTOLIC BLOOD PRESSURE: 145 MMHG | BODY MASS INDEX: 49.44 KG/M2 | HEART RATE: 88 BPM | RESPIRATION RATE: 20 BRPM | OXYGEN SATURATION: 98 % | TEMPERATURE: 99 F

## 2024-09-26 DIAGNOSIS — J20.9 ACUTE BRONCHITIS, UNSPECIFIED ORGANISM: Primary | ICD-10-CM

## 2024-09-26 DIAGNOSIS — R05.9 COUGH: ICD-10-CM

## 2024-09-26 DIAGNOSIS — I10 UNCONTROLLED HYPERTENSION: ICD-10-CM

## 2024-09-26 PROCEDURE — 25000003 PHARM REV CODE 250: Performed by: STUDENT IN AN ORGANIZED HEALTH CARE EDUCATION/TRAINING PROGRAM

## 2024-09-26 PROCEDURE — 63600175 PHARM REV CODE 636 W HCPCS: Performed by: STUDENT IN AN ORGANIZED HEALTH CARE EDUCATION/TRAINING PROGRAM

## 2024-09-26 PROCEDURE — 96372 THER/PROPH/DIAG INJ SC/IM: CPT | Performed by: STUDENT IN AN ORGANIZED HEALTH CARE EDUCATION/TRAINING PROGRAM

## 2024-09-26 PROCEDURE — 99284 EMERGENCY DEPT VISIT MOD MDM: CPT | Mod: 25

## 2024-09-26 RX ORDER — AZITHROMYCIN 250 MG/1
TABLET, FILM COATED ORAL
Qty: 6 TABLET | Refills: 0 | Status: SHIPPED | OUTPATIENT
Start: 2024-09-26 | End: 2024-10-01

## 2024-09-26 RX ORDER — PREDNISONE 20 MG/1
40 TABLET ORAL DAILY
Qty: 10 TABLET | Refills: 0 | Status: SHIPPED | OUTPATIENT
Start: 2024-09-26 | End: 2024-10-01

## 2024-09-26 RX ORDER — DEXAMETHASONE SODIUM PHOSPHATE 4 MG/ML
8 INJECTION, SOLUTION INTRA-ARTICULAR; INTRALESIONAL; INTRAMUSCULAR; INTRAVENOUS; SOFT TISSUE
Status: COMPLETED | OUTPATIENT
Start: 2024-09-26 | End: 2024-09-26

## 2024-09-26 RX ORDER — ALBUTEROL SULFATE 90 UG/1
2 INHALANT RESPIRATORY (INHALATION) EVERY 6 HOURS PRN
Qty: 18 G | Refills: 0 | Status: SHIPPED | OUTPATIENT
Start: 2024-09-26 | End: 2025-09-26

## 2024-09-26 RX ORDER — CLONIDINE HYDROCHLORIDE 0.1 MG/1
0.2 TABLET ORAL
Status: COMPLETED | OUTPATIENT
Start: 2024-09-26 | End: 2024-09-26

## 2024-09-26 RX ADMIN — CLONIDINE HYDROCHLORIDE 0.2 MG: 0.1 TABLET ORAL at 07:09

## 2024-09-26 RX ADMIN — DEXAMETHASONE SODIUM PHOSPHATE 8 MG: 4 INJECTION, SOLUTION INTRA-ARTICULAR; INTRALESIONAL; INTRAMUSCULAR; INTRAVENOUS; SOFT TISSUE at 08:09

## 2024-09-26 NOTE — Clinical Note
"Ryan Bedoya" Reed was seen and treated in our emergency department on 9/26/2024.  He may return to work on 09/27/2024.       If you have any questions or concerns, please don't hesitate to call.       RN    "

## 2024-09-26 NOTE — Clinical Note
"Ryan Bedoya" Reed was seen and treated in our emergency department on 9/26/2024.  He may return to work on 09/28/2024.       If you have any questions or concerns, please don't hesitate to call.       RN    "

## 2024-09-27 NOTE — ED PROVIDER NOTES
"Encounter Date: 9/26/2024       History     Chief Complaint   Patient presents with    Cough     Painful Cough with green sputum for 1 week-recently treated for sinus infection     HPI    3-year-old male with a past medical history of morbid obesity, hypertension and depression presents emergency department for cough.  Patient states that he has been having a cough for a week.  States he has been taking Zyrtec with no improvement.  States it started as sinus congestion now he has a cough.  States he is coughing up green sputum.  States his lungs burn when he coughs.  No shortness a breath.  Denies any fever.  States his wife has bronchitis.    Review of patient's allergies indicates:   Allergen Reactions    Grass pollen-june grass standard Other (See Comments)     Pollen. States becomes "very irritable"      Past Medical History:   Diagnosis Date    Allergy 4/25/2010    COVID-19 03/04/2024    Depression     Hypertension     Obesity, unspecified      No past surgical history on file.  Family History   Problem Relation Name Age of Onset    Diabetes Sister Robert Cardona      Social History     Tobacco Use    Smoking status: Never    Smokeless tobacco: Never   Substance Use Topics    Alcohol use: Not Currently    Drug use: Never     Review of Systems   Constitutional:  Negative for fever.   Respiratory:  Positive for cough.    Cardiovascular:  Negative for chest pain.   Gastrointestinal:  Negative for abdominal pain, constipation, diarrhea, nausea and vomiting.   Neurological:  Negative for headaches.   All other systems reviewed and are negative.      Physical Exam     Initial Vitals [09/26/24 1902]   BP Pulse Resp Temp SpO2   (!) 199/108 89 20 98.5 °F (36.9 °C) 96 %      MAP       --         Physical Exam    Nursing note and vitals reviewed.  Constitutional: He appears well-developed and well-nourished. No distress.   Morbidly obese   HENT:   Right Ear: External ear normal.   Left Ear: External ear normal. "   Cardiovascular:  Normal rate, regular rhythm and intact distal pulses.           Pulmonary/Chest: Breath sounds normal. No respiratory distress. He has no wheezes. He has no rhonchi.   Abdominal: Abdomen is soft. Bowel sounds are normal. There is no abdominal tenderness.   Musculoskeletal:         General: Normal range of motion.     Neurological: He is alert and oriented to person, place, and time. GCS score is 15. GCS eye subscore is 4. GCS verbal subscore is 5. GCS motor subscore is 6.   Skin: Skin is warm. Capillary refill takes less than 2 seconds.   Psychiatric: He has a normal mood and affect. Thought content normal.         ED Course   Procedures  Labs Reviewed - No data to display       Imaging Results              X-Ray Chest PA And Lateral (Final result)  Result time 09/26/24 19:26:37      Final result by Ry Recio MD (09/26/24 19:26:37)                   Impression:      No acute abnormality.      Electronically signed by: Ry Recio MD  Date:    09/26/2024  Time:    19:26               Narrative:    EXAMINATION:  XR CHEST PA AND LATERAL    CLINICAL HISTORY:  Cough, unspecified    TECHNIQUE:  PA and lateral views of the chest were performed.    COMPARISON:  09/20/2024    FINDINGS:  The lungs are clear, with normal appearance of pulmonary vasculature and no pleural effusion or pneumothorax.    The cardiac silhouette is normal in size. The hilar and mediastinal contours are unremarkable.    Bones are intact.                                       Medications   cloNIDine tablet 0.2 mg (0.2 mg Oral Given 9/26/24 1917)     Medical Decision Making  Initial Assessment:       Bronchitis      Differential Diagnosis:   Judging by the patient's chief complaint and pertinent history, the patient has the following possible differential diagnoses, including but not limited to the following.  Some of these are deemed to be lower likelihood and some more likely based on my physical exam and history combined  with possible lab work and/or imaging studies.   Please see the pertinent studies, and refer to the HPI.  Some of these diagnoses will take further evaluation to fully rule out, perhaps as an outpatient and the patient was encouraged to follow up when discharged for more comprehensive evaluation.      Viral syndrome, URI, bronchitis, pneumonia, PE, hypertension  as well as multiple other possible etiologies      Patient states his blood pressure has been running high ever since he has been sick.  Missed his blood pressure medicine this morning.  No chest pain or shortness of breath.    Problems Addressed:  Acute bronchitis, unspecified organism: acute illness or injury  Uncontrolled hypertension: chronic illness or injury    Amount and/or Complexity of Data Reviewed  Radiology: ordered. Decision-making details documented in ED Course.    Risk  Prescription drug management.               ED Course as of 09/26/24 2019   Thu Sep 26, 2024   1917 Will treat bronchitis with antibiotics secondary to comorbidities including obesity and hypertension [BS]   1935 X-Ray Chest PA And Lateral  Lungs clear no consolidations [BS]   2019 Blood pressure improved.  Will discharge [BS]      ED Course User Index  [BS] Mario Canada MD                             Clinical Impression:  Final diagnoses:  [R05.9] Cough  [J20.9] Acute bronchitis, unspecified organism (Primary)  [I10] Uncontrolled hypertension          ED Disposition Condition    Discharge Stable          ED Prescriptions       Medication Sig Dispense Start Date End Date Auth. Provider    azithromycin (Z-FLASH) 250 MG tablet Take 2 tablets by mouth on day 1; Take 1 tablet by mouth on days 2-5 6 tablet 9/26/2024 10/1/2024 Mario Canada MD    predniSONE (DELTASONE) 20 MG tablet Take 2 tablets (40 mg total) by mouth once daily. for 5 days 10 tablet 9/26/2024 10/1/2024 Mario Canada MD    albuterol (VENTOLIN HFA) 90 mcg/actuation inhaler Inhale 2 puffs into the  lungs every 6 (six) hours as needed for Wheezing. Rescue 18 g 9/26/2024 9/26/2025 Mario Canada MD          Follow-up Information       Follow up With Specialties Details Why Contact Info    Anum Ward NP Family Medicine Schedule an appointment as soon as possible for a visit   [address] [facility] [address] [phone]      [facility] - Emergency Dept Emergency Medicine Go to  If symptoms worsen [address] [place] [id number] [phone]             Mario Canada MD  09/26/24 2019

## 2024-10-01 ENCOUNTER — OUTPATIENT CASE MANAGEMENT (OUTPATIENT)
Dept: ADMINISTRATIVE | Facility: OTHER | Age: 34
End: 2024-10-01
Payer: COMMERCIAL

## 2024-10-02 ENCOUNTER — OUTPATIENT CASE MANAGEMENT (OUTPATIENT)
Dept: ADMINISTRATIVE | Facility: OTHER | Age: 34
End: 2024-10-02
Payer: COMMERCIAL

## 2024-10-02 ENCOUNTER — HOSPITAL ENCOUNTER (EMERGENCY)
Facility: HOSPITAL | Age: 34
Discharge: HOME | End: 2024-10-02
Attending: EMERGENCY MEDICINE
Payer: COMMERCIAL

## 2024-10-02 VITALS
WEIGHT: 315 LBS | HEIGHT: 67 IN | DIASTOLIC BLOOD PRESSURE: 88 MMHG | RESPIRATION RATE: 15 BRPM | TEMPERATURE: 98 F | HEART RATE: 86 BPM | BODY MASS INDEX: 49.44 KG/M2 | SYSTOLIC BLOOD PRESSURE: 129 MMHG | OXYGEN SATURATION: 96 %

## 2024-10-02 DIAGNOSIS — R52 PAIN: ICD-10-CM

## 2024-10-02 DIAGNOSIS — M54.9 MUSCULOSKELETAL BACK PAIN: Primary | ICD-10-CM

## 2024-10-02 PROCEDURE — 63600175 PHARM REV CODE 636 W HCPCS

## 2024-10-02 PROCEDURE — 96372 THER/PROPH/DIAG INJ SC/IM: CPT

## 2024-10-02 PROCEDURE — 99284 EMERGENCY DEPT VISIT MOD MDM: CPT | Mod: 25

## 2024-10-02 RX ORDER — CYCLOBENZAPRINE HCL 10 MG
10 TABLET ORAL 3 TIMES DAILY PRN
Qty: 15 TABLET | Refills: 0 | Status: SHIPPED | OUTPATIENT
Start: 2024-10-02 | End: 2024-10-07

## 2024-10-02 RX ORDER — KETOROLAC TROMETHAMINE 30 MG/ML
30 INJECTION, SOLUTION INTRAMUSCULAR; INTRAVENOUS
Status: COMPLETED | OUTPATIENT
Start: 2024-10-02 | End: 2024-10-02

## 2024-10-02 RX ORDER — KETOROLAC TROMETHAMINE 10 MG/1
10 TABLET, FILM COATED ORAL EVERY 6 HOURS PRN
Qty: 20 TABLET | Refills: 0 | Status: SHIPPED | OUTPATIENT
Start: 2024-10-02 | End: 2024-10-07

## 2024-10-02 RX ADMIN — KETOROLAC TROMETHAMINE 30 MG: 30 INJECTION, SOLUTION INTRAMUSCULAR at 10:10

## 2024-10-02 NOTE — Clinical Note
"Ryan Bedoya" Mcbride was seen and treated in our emergency department on 10/2/2024.  He may return to work on 10/04/2024.       If you have any questions or concerns, please don't hesitate to call.      Hortensia RODRIGUEZ"

## 2024-10-03 NOTE — ED PROVIDER NOTES
"Encounter Date: 10/2/2024       History     Chief Complaint   Patient presents with    Back Pain     Bilateral upper back pain x3 wks      34 y.o.  male with a history of hypertension, depression, and allergies presents to Emergency Department with a chief complaint of atraumatic R upper back pain. Symptoms began 3 weeks ago and have been intermittent since onset. Patient reports he recently had bronchitis and has been coughing. Associated symptoms include none. Symptoms are aggravated with coughing, deep breathing, and palpation and there are no alleviating factors. The patient denies CP, SOB, fever, chills, recent injury, or dizziness.. No other reported symptoms at this time      The history is provided by the patient. No  was used.   Back Pain   This is a new problem. The current episode started several weeks ago. The problem occurs intermittently. The problem has been unchanged. The pain is associated with no known injury. The pain does not radiate. The pain is The same all the time. Stiffness is present All day. Pertinent negatives include no chest pain, no fever, no numbness, no weight loss, no abdominal pain, no abdominal swelling, no bowel incontinence, no perianal numbness, no bladder incontinence, no dysuria, no paresthesias, no paresis, no tingling and no weakness. He has tried nothing for the symptoms. Risk factors include obesity and lack of exercise.     Review of patient's allergies indicates:   Allergen Reactions    Grass pollen-june grass standard Other (See Comments)     Pollen. States becomes "very irritable"      Past Medical History:   Diagnosis Date    Allergy 4/25/2010    COVID-19 03/04/2024    Depression     Hypertension     Obesity, unspecified      No past surgical history on file.  Family History   Problem Relation Name Age of Onset    Diabetes Sister Robert Cardona      Social History     Tobacco Use    Smoking status: Never    Smokeless tobacco: " Never   Substance Use Topics    Alcohol use: Not Currently    Drug use: Never     Review of Systems   Constitutional:  Negative for chills, fatigue, fever and weight loss.   Eyes:  Negative for photophobia and visual disturbance.   Respiratory:  Negative for cough, shortness of breath, wheezing and stridor.    Cardiovascular:  Negative for chest pain, palpitations and leg swelling.   Gastrointestinal:  Negative for abdominal pain, bowel incontinence, nausea and vomiting.   Genitourinary:  Negative for bladder incontinence and dysuria.   Musculoskeletal:  Positive for back pain and myalgias.   Neurological:  Negative for dizziness, tingling, speech difficulty, weakness, numbness and paresthesias.   All other systems reviewed and are negative.      Physical Exam     Initial Vitals [10/02/24 2032]   BP Pulse Resp Temp SpO2   (!) 147/89 101 16 98.2 °F (36.8 °C) 98 %      MAP       --         Physical Exam    Nursing note and vitals reviewed.  Constitutional: He appears well-developed and well-nourished. He is not diaphoretic. He is cooperative.  Non-toxic appearance. No distress.   HENT:   Head: Normocephalic and atraumatic.   Right Ear: External ear normal.   Left Ear: External ear normal.   Nose: Nose normal.   Eyes: Conjunctivae and EOM are normal. Pupils are equal, round, and reactive to light.   Neck: Neck supple.   Normal range of motion.  Cardiovascular:  Normal rate, regular rhythm, S1 normal, S2 normal, normal heart sounds, intact distal pulses and normal pulses.           Pulmonary/Chest: Effort normal and breath sounds normal. No tachypnea and no bradypnea. No respiratory distress. He has no decreased breath sounds. He has no wheezes. He has no rhonchi. He has no rales. He exhibits no tenderness.   Abdominal: Abdomen is soft. Bowel sounds are normal. He exhibits no distension. There is no abdominal tenderness. There is no rebound.   Musculoskeletal:         General: Tenderness present. Normal range of motion.       Cervical back: Normal range of motion and neck supple.        Back:       Comments: Tenderness noted to outlined area. No spinous tenderness on exam. Full 5/5 ROM noted. CMS intact. All other adjacent joints otherwise normal.        Neurological: He is alert and oriented to person, place, and time. He has normal strength. No sensory deficit. GCS score is 15. GCS eye subscore is 4. GCS verbal subscore is 5. GCS motor subscore is 6.   Skin: Skin is warm and dry. Capillary refill takes less than 2 seconds.   Psychiatric: He has a normal mood and affect. Thought content normal.         ED Course   Procedures  Labs Reviewed - No data to display       Imaging Results              X-Ray Chest PA And Lateral (Final result)  Result time 10/02/24 21:35:22      Final result by Juan Antonio Duncan MD (10/02/24 21:35:22)                   Impression:      No acute cardiopulmonary process identified.      Electronically signed by: Juan Antonio Duncan  Date:    10/02/2024  Time:    21:35               Narrative:    EXAMINATION:  XR CHEST PA AND LATERAL    CLINICAL HISTORY:  Pain, unspecified    TECHNIQUE:  Two-view    COMPARISON:  July 26, 2024.    FINDINGS:  Cardiopericardial silhouette is within normal limits. Lungs are without dense focal or segmental consolidation, congestion, pleural effusion or pneumothorax.                                       Medications   ketorolac injection 30 mg (30 mg Intramuscular Given 10/2/24 2221)     Medical Decision Making  Patient awake, alert, has non-labored breathing, and follows commands appropriately. Arrived to ED due to atraumatic R upper back pain. Denies injury/trauma. Recently got over bronchitis. Afebrile. NAD Noted.     Judging by the patient's chief complaint and pertinent history, the patient has the following possible differential diagnoses, including but not limited to the following: Back Pain, Musculoskeletal Pain, Muscle Strain    Some of these are deemed to be lower likelihood and  some more likely based on my physical exam and history combined with possible lab work and/or imaging studies. Please see the pertinent studies, and refer to the HPI. Some of these diagnoses will take further evaluation to fully rule out, perhaps as an outpatient and the patient was encouraged to follow up when discharged for more comprehensive evaluation.       Amount and/or Complexity of Data Reviewed  Radiology: ordered. Decision-making details documented in ED Course.     Details: Informed patient of results.   Discussion of management or test interpretation with external provider(s): Discussed plan of care and interventions with patient. Agreed to and aware of plan of care. Comfortable being discharged home. Patient discharged home. Patient denies new or additional complaints; no further tests indicated at this time. Verbalized understanding of instructions. No emergent or apparent distress noted prior to discharge. Strict ER return precautions given.       Risk  OTC drugs.  Prescription drug management.               ED Course as of 10/02/24 2331   Wed Oct 02, 2024   2225 X-Ray Chest PA And Lateral  Cardiopericardial silhouette is within normal limits. Lungs are without dense focal or segmental consolidation, congestion, pleural effusion or pneumothora [JA]   2327 Patient's imaging unremarkable. Presentation reveals musculoskeletal pain. Patient reports he recently got over bronchitis and had been coughing, which is likely the cause of symptoms. Imaging unremarkable on today. Will discharge home on NSAIDs and muscle relaxer agents. Cautioned on side effects. At disposition, patient has no additional complaints, VSS, full 5/5 ROM noted to BUE/BLE, and has outpatient f/u soon. Stable for discharge home.  [JA]      ED Course User Index  [JA] Soco Read, NP                           Clinical Impression:  Final diagnoses:  [R52] Pain  [M54.9] Musculoskeletal back pain (Primary)          ED Disposition  Condition    Discharge Stable          ED Prescriptions       Medication Sig Dispense Start Date End Date Auth. Provider    ketorolac (TORADOL) 10 mg tablet Take 1 tablet (10 mg total) by mouth every 6 (six) hours as needed for Pain. 20 tablet 10/2/2024 10/7/2024 Soco Read, NP    cyclobenzaprine (FLEXERIL) 10 MG tablet Take 1 tablet (10 mg total) by mouth 3 (three) times daily as needed for Muscle spasms. 15 tablet 10/2/2024 10/7/2024 Soco Read, JUDI          Follow-up Information       Follow up With Specialties Details Why Contact Info    Anum Ward NP Family Medicine Call in 1 day If symptoms worsen, As needed [address] [facility] [address]  [phone]      [facility] - Emergency Dept Emergency Medicine Go to  If symptoms worsen, As needed [address] [facility] [id number] [phone]             Soco Read NP  10/03/24 0053

## 2024-10-03 NOTE — DISCHARGE INSTRUCTIONS
Thanks for letting us take care of you today!  It is our goal to give you courteous care and to keep you comfortable and informed, if you have any questions before you leave I will be happy to try and answer them.    Here is some advice after your visit:      Your visit in the emergency department is NOT definitive care - please follow-up with your primary care doctor and/or specialist within 1 week.  Please return if you have any worsening in your condition or if you have any other concerns.    If you had radiology exams like an XRAY or CT in the emergency Department the interpreation on them may be preliminary - there may be less time sensitive findings on the reports please obtain these reports within 24 hours from the hospital or by using your out on your mobile phone to access records.  Bring these to your primary care doctor and/or specialist for further review of incidental findings.    You have been prescribed Toradol for pain. This is an Non-Steroidal Anti-Inflammatory (NSAID) Medication. Please do not take any additional NSAIDs while you are taking this medication including (Advil, Aleve, Motrin, Ibuprofen, Mobic\meloxicam, Naprosyn, Toradol, etc.). Please stop taking this medication if you experience: weakness, itching, yellow skin or eyes, joint pains, vomiting blood, blood or black stools, unusual weight gain, or swelling in your arms, legs, hands, or feet.     You have been prescribed Flexeril (Cyclobenzaprine) for muscle spasms/pain. Please do not take this medication while working, drinking alcohol, swimming, or while driving/operating heavy machinery. This medication may cause drowsiness, dizziness, impair judgment, and reduce physical capabilities.You should not drive, operate heavy machinery, or make life changing decisions while taking this medication.

## 2024-10-03 NOTE — FIRST PROVIDER EVALUATION
Medical screening examination initiated.  I have conducted a focused provider triage encounter, findings are as follows:    Brief history of present illness:  34 year old male presents to ER with c/o upper back pain. Reports he has been dealing with bronchitis for 4 weeks and feels like he needs a steroid shot    There were no vitals filed for this visit.    Pertinent physical exam:  Awake and alert, nad    Brief workup plan:  CXR    Preliminary workup initiated; this workup will be continued and followed by the physician or advanced practice provider that is assigned to the patient when roomed.

## 2024-10-08 ENCOUNTER — OUTPATIENT CASE MANAGEMENT (OUTPATIENT)
Dept: ADMINISTRATIVE | Facility: OTHER | Age: 34
End: 2024-10-08
Payer: COMMERCIAL

## 2024-10-08 NOTE — PROGRESS NOTES
Outpatient Care Management  Plan of Care Follow Up Visit    Patient: Ryan Mcbride  MRN: [id number]  Date of Service: 10/08/2024  Completed by: Mirna Shah RN  Referral Date: 05/07/2024    Reason for Visit   Patient presents with    OPCM RN Follow Up Call    Other       Brief Summary:   OPCM RN follow-up call completed.   Reviewed and education provided on:   Importance of calling r/t asst w/ rent.   Benefit of taking one step at a time & prioritizing     Next Steps:   Ryan agrees to follow up call within 2 weeks on or around 10/22. Will discuss:  Health Status  Case Graduation

## 2024-10-14 ENCOUNTER — TELEPHONE (OUTPATIENT)
Dept: FAMILY MEDICINE | Facility: CLINIC | Age: 34
End: 2024-10-14
Payer: COMMERCIAL

## 2024-10-14 NOTE — TELEPHONE ENCOUNTER
----- Message from [employer] sent at 10/14/2024 11:47 AM CDT -----  .Type:  Patient Returning Call    Who Called:PT  Who Left Message for Patient:PT  Does the patient know what this is regarding?:Medication refill and Check up  Would the patient rather a call back or a response via [facility]? ANAM  Best Call Back Number:[phone]  Additional Information: Please call back to schedule an appt for Medication refill and Check u. Nothing is showing on my end

## 2024-10-15 ENCOUNTER — TELEPHONE (OUTPATIENT)
Dept: FAMILY MEDICINE | Facility: CLINIC | Age: 34
End: 2024-10-15
Payer: COMMERCIAL

## 2024-10-15 NOTE — TELEPHONE ENCOUNTER
----- Message from [place] sent at 10/15/2024  1:24 PM CDT -----  .Type:  Patient Returning Call    Who Called:pt  Who Left Message for Patient:pt  Does the patient know what this is regarding?:Check up/ Medication refill/ Missed call   Would the patient rather a call back or a response via [facility]? yanique  Best Call Back Number:[phone]   Additional Information: Please call back about appt for Check up/ Medication refill

## 2024-10-22 ENCOUNTER — OUTPATIENT CASE MANAGEMENT (OUTPATIENT)
Dept: ADMINISTRATIVE | Facility: OTHER | Age: 34
End: 2024-10-22
Payer: COMMERCIAL

## 2024-10-29 ENCOUNTER — HOSPITAL ENCOUNTER (EMERGENCY)
Facility: HOSPITAL | Age: 34
Discharge: HOME | End: 2024-10-29
Attending: STUDENT IN AN ORGANIZED HEALTH CARE EDUCATION/TRAINING PROGRAM
Payer: COMMERCIAL

## 2024-10-29 VITALS
DIASTOLIC BLOOD PRESSURE: 114 MMHG | HEIGHT: 67 IN | WEIGHT: 315 LBS | TEMPERATURE: 98 F | OXYGEN SATURATION: 98 % | HEART RATE: 101 BPM | BODY MASS INDEX: 49.44 KG/M2 | RESPIRATION RATE: 18 BRPM | SYSTOLIC BLOOD PRESSURE: 164 MMHG

## 2024-10-29 DIAGNOSIS — L02.212 BACK ABSCESS: Primary | ICD-10-CM

## 2024-10-29 PROCEDURE — 99284 EMERGENCY DEPT VISIT MOD MDM: CPT

## 2024-10-29 PROCEDURE — 25000003 PHARM REV CODE 250

## 2024-10-29 PROCEDURE — 10060 I&D ABSCESS SIMPLE/SINGLE: CPT

## 2024-10-29 RX ORDER — HYDROCODONE BITARTRATE AND ACETAMINOPHEN 7.5; 325 MG/1; MG/1
1 TABLET ORAL EVERY 6 HOURS PRN
Qty: 16 TABLET | Refills: 0 | Status: SHIPPED | OUTPATIENT
Start: 2024-10-29 | End: 2024-11-02

## 2024-10-29 RX ORDER — CLONIDINE HYDROCHLORIDE 0.1 MG/1
0.1 TABLET ORAL
Status: COMPLETED | OUTPATIENT
Start: 2024-10-29 | End: 2024-10-29

## 2024-10-29 RX ORDER — SULFAMETHOXAZOLE AND TRIMETHOPRIM 800; 160 MG/1; MG/1
1 TABLET ORAL 2 TIMES DAILY
Qty: 20 TABLET | Refills: 0 | Status: SHIPPED | OUTPATIENT
Start: 2024-10-29 | End: 2024-11-08

## 2024-10-29 RX ADMIN — CLONIDINE HYDROCHLORIDE 0.1 MG: 0.1 TABLET ORAL at 07:10

## 2024-10-29 NOTE — FIRST PROVIDER EVALUATION
"Medical screening examination initiated.  I have conducted a focused provider triage encounter, findings are as follows:    Brief history of present illness:  34 year old male presents to ER with c/o recurrent abscess to left flank/back x 1 week. Denies fever, chills, or drainage    Vitals:    10/29/24 1842   BP: (!) 185/107   Pulse: 100   Resp: 18   Temp: 98.1 °F (36.7 °C)   TempSrc: Oral   SpO2: 97%   Weight: (!) 149.7 kg (330 lb)   Height: 5' 7" (1.702 m)       Pertinent physical exam:  awake and alert, nad    Brief workup plan:  Physical exam     Preliminary workup initiated; this workup will be continued and followed by the physician or advanced practice provider that is assigned to the patient when roomed.  "

## 2024-10-29 NOTE — Clinical Note
"Ryan Bedoya"Reed was seen and treated in our emergency department on 10/29/2024.  He may return to work on 10/31/2024.       If you have any questions or concerns, please don't hesitate to call.      Janeth RODRIGUEZ    "

## 2024-10-30 ENCOUNTER — OUTPATIENT CASE MANAGEMENT (OUTPATIENT)
Dept: ADMINISTRATIVE | Facility: OTHER | Age: 34
End: 2024-10-30
Payer: COMMERCIAL

## 2024-10-30 NOTE — ED PROVIDER NOTES
"Encounter Date: 10/29/2024       History     Chief Complaint   Patient presents with    Abscess     Pt ambulatory to triage and presents via POV. States that has abscess in L axillary area that he noticed approx 1 week PTA. Endorses pain at the site but denies drainage and fevers at home.      See MDM    The history is provided by the patient. No  was used.     Review of patient's allergies indicates:   Allergen Reactions    Grass pollen-june grass standard Other (See Comments)     Pollen. States becomes "very irritable"      Past Medical History:   Diagnosis Date    Allergy 4/25/2010    COVID-19 03/04/2024    Depression     Hypertension     Obesity, unspecified      History reviewed. No pertinent surgical history.  Family History   Problem Relation Name Age of Onset    Diabetes Sister Robert Cardona      Social History     Tobacco Use    Smoking status: Never    Smokeless tobacco: Never   Substance Use Topics    Alcohol use: Not Currently    Drug use: Never     Review of Systems   Constitutional:  Negative for fever.   Respiratory:  Negative for cough and shortness of breath.    Cardiovascular:  Negative for chest pain.   Gastrointestinal:  Negative for abdominal pain.   Genitourinary:  Negative for difficulty urinating and dysuria.   Musculoskeletal:  Negative for gait problem.   Skin:  Negative for color change.   Neurological:  Negative for dizziness, speech difficulty and headaches.   Psychiatric/Behavioral:  Negative for hallucinations and suicidal ideas.    All other systems reviewed and are negative.      Physical Exam     Initial Vitals [10/29/24 1842]   BP Pulse Resp Temp SpO2   (!) 185/107 100 18 98.1 °F (36.7 °C) 97 %      MAP       --         Physical Exam    Nursing note and vitals reviewed.  Constitutional: He appears well-developed and well-nourished.   HENT:   Head: Normocephalic.   Eyes: EOM are normal.   Neck: Neck supple.   Normal range of motion.  Cardiovascular:  Normal " rate, regular rhythm, normal heart sounds and intact distal pulses.           Pulmonary/Chest: Breath sounds normal.   Abdominal: Abdomen is soft. Bowel sounds are normal.   Musculoskeletal:         General: Normal range of motion.      Cervical back: Normal range of motion and neck supple.     Neurological: He is alert and oriented to person, place, and time. He has normal strength.   Skin: Skin is warm and dry. Capillary refill takes less than 2 seconds.   Abscess left axilla    Psychiatric: He has a normal mood and affect. His behavior is normal. Judgment and thought content normal.         ED Course   Procedures  Labs Reviewed - No data to display       Imaging Results    None          Medications   cloNIDine tablet 0.1 mg (0.1 mg Oral Given 10/29/24 1916)     Medical Decision Making  Historian:  Patient.  Patient is a Black or  34 y.o. male that presents with assess to the left thoracic region of back that has been present 1 week. Associated symptoms nothing. Surrounding information is nothing. Exacerbated by nothing. Relieved by nothing. Patient treatment prior to arrival none. Risk factors include none. Other history pertaining to this complaint nothing.   Assessment:  See physical exam.  DD:  Abscess  ED Course: History was obtained.  Physical was performed.  She had appeared to have an abscess.  I&D was performed with good results.. Medical or surgical consults:  None. Social determinants that affect healthcare:  None.       Risk  Prescription drug management.  Risk Details: Bactrim and Norco                                      Clinical Impression:  Final diagnoses:  [L02.212] Back abscess (Primary)          ED Disposition Condition    Discharge Stable          ED Prescriptions       Medication Sig Dispense Start Date End Date Auth. Provider    HYDROcodone-acetaminophen (NORCO) 7.5-325 mg per tablet Take 1 tablet by mouth every 6 (six) hours as needed for Pain. 16 tablet 10/29/2024  11/2/2024 Rick Gonsalez FNP    sulfamethoxazole-trimethoprim 800-160mg (BACTRIM DS) 800-160 mg Tab Take 1 tablet by mouth 2 (two) times daily. for 10 days 20 tablet 10/29/2024 11/8/2024 Rick Gonsalez FNP          Follow-up Information       Follow up With Specialties Details Why Contact Info    Your Primary Care Provider  Call in 3 days ed follow up              Rick Gonsalez FNP  10/29/24 2139       Rick Gonsalez FNP  10/29/24 2147

## 2024-11-19 ENCOUNTER — OUTPATIENT CASE MANAGEMENT (OUTPATIENT)
Dept: ADMINISTRATIVE | Facility: OTHER | Age: 34
End: 2024-11-19
Payer: COMMERCIAL

## 2024-11-22 ENCOUNTER — HOSPITAL ENCOUNTER (EMERGENCY)
Facility: HOSPITAL | Age: 34
Discharge: HOME | End: 2024-11-22
Attending: STUDENT IN AN ORGANIZED HEALTH CARE EDUCATION/TRAINING PROGRAM
Payer: COMMERCIAL

## 2024-11-22 VITALS
SYSTOLIC BLOOD PRESSURE: 122 MMHG | RESPIRATION RATE: 18 BRPM | WEIGHT: 315 LBS | BODY MASS INDEX: 51.06 KG/M2 | DIASTOLIC BLOOD PRESSURE: 89 MMHG | HEART RATE: 91 BPM | OXYGEN SATURATION: 98 % | TEMPERATURE: 99 F

## 2024-11-22 DIAGNOSIS — L02.91 ABSCESS: Primary | ICD-10-CM

## 2024-11-22 DIAGNOSIS — I10 HYPERTENSION, UNSPECIFIED TYPE: ICD-10-CM

## 2024-11-22 LAB
ALBUMIN SERPL-MCNC: 3.9 G/DL (ref 3.5–5)
ALBUMIN/GLOB SERPL: 1.2 RATIO (ref 1.1–2)
ALP SERPL-CCNC: 86 UNIT/L (ref 40–150)
ALT SERPL-CCNC: 25 UNIT/L (ref 0–55)
ANION GAP SERPL CALC-SCNC: 8 MEQ/L
AST SERPL-CCNC: 20 UNIT/L (ref 5–34)
BASOPHILS # BLD AUTO: 0.04 X10(3)/MCL
BASOPHILS NFR BLD AUTO: 0.5 %
BILIRUB SERPL-MCNC: 0.6 MG/DL
BUN SERPL-MCNC: 12.8 MG/DL (ref 8.9–20.6)
CALCIUM SERPL-MCNC: 9.8 MG/DL (ref 8.4–10.2)
CHLORIDE SERPL-SCNC: 105 MMOL/L (ref 98–107)
CO2 SERPL-SCNC: 27 MMOL/L (ref 22–29)
CREAT SERPL-MCNC: 1.13 MG/DL (ref 0.72–1.25)
CREAT/UREA NIT SERPL: 11
EOSINOPHIL # BLD AUTO: 0.38 X10(3)/MCL (ref 0–0.9)
EOSINOPHIL NFR BLD AUTO: 4.5 %
ERYTHROCYTE [DISTWIDTH] IN BLOOD BY AUTOMATED COUNT: 15.4 % (ref 11.5–17)
GFR SERPLBLD CREATININE-BSD FMLA CKD-EPI: >60 ML/MIN/1.73/M2
GLOBULIN SER-MCNC: 3.3 GM/DL (ref 2.4–3.5)
GLUCOSE SERPL-MCNC: 129 MG/DL (ref 74–100)
HCT VFR BLD AUTO: 42.7 % (ref 42–52)
HGB BLD-MCNC: 13.8 G/DL (ref 14–18)
IMM GRANULOCYTES # BLD AUTO: 0.04 X10(3)/MCL (ref 0–0.04)
IMM GRANULOCYTES NFR BLD AUTO: 0.5 %
LYMPHOCYTES # BLD AUTO: 1.42 X10(3)/MCL (ref 0.6–4.6)
LYMPHOCYTES NFR BLD AUTO: 16.7 %
MCH RBC QN AUTO: 24.7 PG (ref 27–31)
MCHC RBC AUTO-ENTMCNC: 32.3 G/DL (ref 33–36)
MCV RBC AUTO: 76.4 FL (ref 80–94)
MONOCYTES # BLD AUTO: 0.55 X10(3)/MCL (ref 0.1–1.3)
MONOCYTES NFR BLD AUTO: 6.5 %
NEUTROPHILS # BLD AUTO: 6.09 X10(3)/MCL (ref 2.1–9.2)
NEUTROPHILS NFR BLD AUTO: 71.3 %
NRBC BLD AUTO-RTO: 0 %
PLATELET # BLD AUTO: 262 X10(3)/MCL (ref 130–400)
PMV BLD AUTO: 10.1 FL (ref 7.4–10.4)
POTASSIUM SERPL-SCNC: 3.3 MMOL/L (ref 3.5–5.1)
PROT SERPL-MCNC: 7.2 GM/DL (ref 6.4–8.3)
RBC # BLD AUTO: 5.59 X10(6)/MCL (ref 4.7–6.1)
SODIUM SERPL-SCNC: 140 MMOL/L (ref 136–145)
WBC # BLD AUTO: 8.52 X10(3)/MCL (ref 4.5–11.5)

## 2024-11-22 PROCEDURE — 99284 EMERGENCY DEPT VISIT MOD MDM: CPT

## 2024-11-22 PROCEDURE — 80053 COMPREHEN METABOLIC PANEL: CPT

## 2024-11-22 PROCEDURE — 25000003 PHARM REV CODE 250

## 2024-11-22 PROCEDURE — 10060 I&D ABSCESS SIMPLE/SINGLE: CPT

## 2024-11-22 PROCEDURE — 85025 COMPLETE CBC W/AUTO DIFF WBC: CPT

## 2024-11-22 RX ORDER — LOSARTAN POTASSIUM 50 MG/1
50 TABLET ORAL DAILY
Qty: 90 TABLET | Refills: 0 | Status: SHIPPED | OUTPATIENT
Start: 2024-11-22 | End: 2025-02-20

## 2024-11-22 RX ORDER — SULFAMETHOXAZOLE AND TRIMETHOPRIM 800; 160 MG/1; MG/1
1 TABLET ORAL 2 TIMES DAILY
Qty: 14 TABLET | Refills: 0 | Status: SHIPPED | OUTPATIENT
Start: 2024-11-22 | End: 2024-11-29

## 2024-11-22 RX ORDER — BUPROPION HYDROCHLORIDE 150 MG/1
150 TABLET ORAL DAILY
Qty: 30 TABLET | Refills: 11 | Status: SHIPPED | OUTPATIENT
Start: 2024-11-22 | End: 2025-11-22

## 2024-11-22 RX ORDER — HYDROCODONE BITARTRATE AND ACETAMINOPHEN 7.5; 325 MG/1; MG/1
1 TABLET ORAL EVERY 6 HOURS PRN
Qty: 15 TABLET | Refills: 0 | Status: SHIPPED | OUTPATIENT
Start: 2024-11-22 | End: 2024-11-27

## 2024-11-22 RX ADMIN — IBUPROFEN 800 MG: 200 TABLET, FILM COATED ORAL at 06:11

## 2024-11-22 NOTE — ED PROVIDER NOTES
"Encounter Date: 11/22/2024       History     Chief Complaint   Patient presents with    Wound Check     States had an abscess to his back drained in September and now its draining fluid x 5 days. Denies fever.      See MDM    The history is provided by the patient. No  was used.     Review of patient's allergies indicates:   Allergen Reactions    Grass pollen-june grass standard Other (See Comments)     Pollen. States becomes "very irritable"      Past Medical History:   Diagnosis Date    Allergy 4/25/2010    COVID-19 03/04/2024    Depression     Hypertension     Obesity, unspecified      No past surgical history on file.  Family History   Problem Relation Name Age of Onset    Diabetes Sister Robert Cardona      Social History     Tobacco Use    Smoking status: Never    Smokeless tobacco: Never   Substance Use Topics    Alcohol use: Not Currently    Drug use: Never     Review of Systems   Constitutional:         Abscess On the left side of his back   All other systems reviewed and are negative.      Physical Exam     Initial Vitals [11/22/24 1329]   BP Pulse Resp Temp SpO2   (!) 167/117 92 18 98.6 °F (37 °C) 98 %      MAP       --         Physical Exam    Nursing note and vitals reviewed.  Constitutional: He appears well-developed and well-nourished. He is Obese .   HENT:   Head: Normocephalic and atraumatic.   Eyes: EOM are normal.   Neck:   Normal range of motion.  Musculoskeletal:         General: Normal range of motion.        Arms:       Cervical back: Normal range of motion.      Comments: Abscess on left posterior thorax     Neurological: He is alert and oriented to person, place, and time.   Skin: Skin is warm and dry.   Psychiatric: He has a normal mood and affect.         ED Course   I & D - Incision and Drainage    Date/Time: 11/22/2024 6:32 PM  Location procedure was performed: [facility] EMERGENCY DEPARTMENT    Performed by: Jil Shelton PA-C  Authorized by: Parker Childers MD  " Pre-operative diagnosis: Abcess  Consent Done: Yes  Consent: Verbal consent obtained.  Risks and benefits: risks, benefits and alternatives were discussed  Consent given by: patient  Patient understanding: patient states understanding of the procedure being performed  Patient identity confirmed: verbally with patient  Type: abscess  Body area: trunk  Location details: back  Anesthesia: local infiltration    Anesthesia:  Local Anesthetic: lidocaine 1% without epinephrine  Anesthetic total: 4 mL    Patient sedated: no  Scalpel size: 11  Incision type: single straight  Complexity: simple  Drainage: purulent  Wound treatment: wound packed  Packing material: 1/4 in iodoform gauze  Complications: No  Estimated blood loss (mL): 0  Specimens: No  Implants: No  Patient tolerance: Patient tolerated the procedure well with no immediate complications        Labs Reviewed   COMPREHENSIVE METABOLIC PANEL - Abnormal       Result Value    Sodium 140      Potassium 3.3 (*)     Chloride 105      CO2 27      Glucose 129 (*)     Blood Urea Nitrogen 12.8      Creatinine 1.13      Calcium 9.8      Protein Total 7.2      Albumin 3.9      Globulin 3.3      Albumin/Globulin Ratio 1.2      Bilirubin Total 0.6      ALP 86      ALT 25      AST 20      eGFR >60      Anion Gap 8.0      BUN/Creatinine Ratio 11     CBC WITH DIFFERENTIAL - Abnormal    WBC 8.52      RBC 5.59      Hgb 13.8 (*)     Hct 42.7      MCV 76.4 (*)     MCH 24.7 (*)     MCHC 32.3 (*)     RDW 15.4      Platelet 262      MPV 10.1      Neut % 71.3      Lymph % 16.7      Mono % 6.5      Eos % 4.5      Basophil % 0.5      Lymph # 1.42      Neut # 6.09      Mono # 0.55      Eos # 0.38      Baso # 0.04      IG# 0.04      IG% 0.5      NRBC% 0.0     CBC W/ AUTO DIFFERENTIAL    Narrative:     The following orders were created for panel order CBC auto differential.  Procedure                               Abnormality         Status                     ---------                                -----------         ------                     CBC with Differential[[id number]]       Abnormal            Final result                 Please view results for these tests on the individual orders.          Imaging Results    None          Medications   ibuprofen tablet 800 mg (800 mg Oral Given 11/22/24 1833)     Medical Decision Making  This is a 34-year-old  male with a PMHx hypertension who presents to the emergency department complaining of an abscess on the left side of his back.  Patient endorses having to have the abscess 3 times in the past with the most recent time being 3 weeks ago.  Patient states that he tried taking Tylenol and soaking in warm baths for the pain without much relief.  He says the abscess has been draining some over the last few days as well.  He states that diclofenac and Bactrim usually help with the symptoms.  Patient states that he is currently out of his Wellbutrin and losartan and has been unable to get in to see his PCP.  He denies any fever, chills, nausea, vomiting, or any other symptoms at this time.    Physical exam pertinent for abscess to left posterior/lateral thorax.    CBC without signs of acute infection.  CMP with no emergent findings.  I&D performed to abscess.  Patient tolerated well.  Will put him on Bactrim and prescribe pain medication.  Will refill patient's Wellbutrin and losartan.  General surgery referral sent for recurrent abscess.  Patient verbalized understanding agreed with the plan.  Discharge instructions and return precautions provided.      Amount and/or Complexity of Data Reviewed  External Data Reviewed: notes.     Details: Previous note on 10/29 for abscess I&D reviewed  Labs: ordered. Decision-making details documented in ED Course.    Risk  Prescription drug management.      Additional MDM:   Differential Diagnosis:   Other: The following diagnoses were also considered and will be evaluated: Cellulitis, Abscess and Insect bite.                                    Clinical Impression:  Final diagnoses:  [L02.91] Abscess (Primary)          ED Disposition Condition    Discharge Stable          ED Prescriptions       Medication Sig Dispense Start Date End Date Auth. Provider    losartan (COZAAR) 50 MG tablet Take 1 tablet (50 mg total) by mouth once daily. 90 tablet 11/22/2024 2/20/2025 Jil Shelton PA-C    buPROPion (WELLBUTRIN XL) 150 MG TB24 tablet Take 1 tablet (150 mg total) by mouth once daily. 30 tablet 11/22/2024 11/22/2025 Jil Shelton PA-C    sulfamethoxazole-trimethoprim 800-160mg (BACTRIM DS) 800-160 mg Tab Take 1 tablet by mouth 2 (two) times daily. for 7 days 14 tablet 11/22/2024 11/29/2024 Jil Shelton PA-C    HYDROcodone-acetaminophen (NORCO) 7.5-325 mg per tablet Take 1 tablet by mouth every 6 (six) hours as needed for Pain. 15 tablet 11/22/2024 11/27/2024 Jil Shelton PA-C          Follow-up Information       Follow up With Specialties Details Why Contact Info    Anum Ward NP Family Medicine Call in 1 week As needed [facility] [facility] [address] [phone]               Jil Shelton PA-C  11/22/24 5903

## 2024-11-22 NOTE — ED NOTES
Lobby Round. Pt found sitting in lobby alert and oriented at this time. Vitals assessed . Pt does not appear to be in any immediate distress at this time.

## 2024-11-22 NOTE — Clinical Note
"Ryan"Jessie Mcbride was seen and treated in our emergency department on 11/22/2024.  He may return to work on 11/27/2024.       If you have any questions or concerns, please don't hesitate to call.      Jil Shelton PA-C"

## 2024-11-22 NOTE — FIRST PROVIDER EVALUATION
Medical screening examination initiated.  I have conducted a focused provider triage encounter, findings are as follows:    Brief history of present illness:  34 year old male reports he had left flank abscess drainage in September and now having drainage from site.     There were no vitals filed for this visit.    Pertinent physical exam:  Awake and alert, NAD    Brief workup plan:  Labs, physical exam     Preliminary workup initiated; this workup will be continued and followed by the physician or advanced practice provider that is assigned to the patient when roomed.

## 2024-11-23 NOTE — DISCHARGE INSTRUCTIONS
Leave your wound packing in for the next 48 hours. Do not get the area wet.  After 48 hours you may remove the packing and cleaned the area with soap and water as normal.  Change your dressing at least twice a day.    Take ibuprofen or Tylenol as needed for pain.  For more severe pain take [place].  Do not drive when taking Norco and make sure to eat when you take this medication.  Take your antibiotics as prescribed.  Follow up with the primary care provider as needed.  If you experience any new or worsening symptoms return to the emergency department.

## 2024-11-26 ENCOUNTER — OUTPATIENT CASE MANAGEMENT (OUTPATIENT)
Dept: ADMINISTRATIVE | Facility: OTHER | Age: 34
End: 2024-11-26
Payer: COMMERCIAL

## 2024-11-26 NOTE — LETTER
Ryan Mcbride  [address] [facility] [address]      Dear Ryan Mcbride,     I am writing from the Outpatient Care Management Department at [facility]. I have been unsuccessful at reaching you. I hope you found the assistance previously provided to you helpful.     Please contact me at [phone] from 8:00AM to 4:30 PM on Monday thru Friday.     As you know, [facility] also has a program with a nurse available 24/7 to answer questions or provide medical advice.  [facility] can be reached at [phone].    Sincerely,       Adriana Farias [facility] Center[facility]ENNIFER  Outpatient Care Management

## 2024-11-26 NOTE — PROGRESS NOTES
12/10/2024  3rd attempt to complete Follow-Up for Outpatient Care Management, left message. Will close case.    12/3/2024  2nd attempt to complete Follow-Up for Outpatient Care Management, left message.    11/26/2024  1st attempt to complete Follow-Up for Outpatient Care Management, left message. Attempt letter sent.

## 2024-12-10 ENCOUNTER — OUTPATIENT CASE MANAGEMENT (OUTPATIENT)
Dept: ADMINISTRATIVE | Facility: OTHER | Age: 34
End: 2024-12-10
Payer: COMMERCIAL

## 2025-02-03 DIAGNOSIS — R79.89 LOW TESTOSTERONE IN MALE: ICD-10-CM

## 2025-02-03 RX ORDER — TESTOSTERONE CYPIONATE 200 MG/ML
200 INJECTION, SOLUTION INTRAMUSCULAR
Qty: 2 ML | Refills: 2 | OUTPATIENT
Start: 2025-02-03 | End: 2025-08-04

## 2025-02-03 NOTE — TELEPHONE ENCOUNTER
This is a controled substance and pt has missed OVs. Should schedule on Callie future template for eval.   Thanks,    YURI Parnell

## 2025-02-10 DIAGNOSIS — I10 HYPERTENSION, UNSPECIFIED TYPE: ICD-10-CM

## 2025-02-10 RX ORDER — LOSARTAN POTASSIUM 50 MG/1
50 TABLET ORAL DAILY
Qty: 90 TABLET | Refills: 1 | Status: SHIPPED | OUTPATIENT
Start: 2025-02-10 | End: 2025-08-09

## 2025-02-10 NOTE — TELEPHONE ENCOUNTER
----- Message from Pennie sent at 2/7/2025  3:47 PM CST -----  Regarding: refill  Who Called: Ryan Mcbride    Refill or New Rx:Refill    RX Name and Strength:losartan (COZAAR) 50 MG tablet    How is the patient currently taking it? (ex. 1XDay):1x day    Is this a 30 day or 90 day RX:90    Local or Mail Order:local    List of preferred pharmacies on file (remove unneeded): [unfilled]    If different Pharmacy is requested, enter Pharmacy information here including location and phone number: [facility] - [address] FLO[address] 1       Ordering Provider:      Preferred Method of Contact: Phone Call  Patient's Preferred Phone Number on File: [phone]   Best Call Back Number, if different:  Additional Information:

## 2025-05-05 DIAGNOSIS — I10 PRIMARY HYPERTENSION: ICD-10-CM

## 2025-05-05 RX ORDER — HYDROCHLOROTHIAZIDE 25 MG/1
25 TABLET ORAL DAILY
Qty: 90 TABLET | Refills: 0 | Status: SHIPPED | OUTPATIENT
Start: 2025-05-05